# Patient Record
Sex: FEMALE | NOT HISPANIC OR LATINO | Employment: FULL TIME | URBAN - NONMETROPOLITAN AREA
[De-identification: names, ages, dates, MRNs, and addresses within clinical notes are randomized per-mention and may not be internally consistent; named-entity substitution may affect disease eponyms.]

---
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
## 2022-02-21 ENCOUNTER — HOSPITAL ENCOUNTER (EMERGENCY)
Facility: HOSPITAL | Age: 22
Discharge: HOME | End: 2022-02-21
Attending: EMERGENCY MEDICINE | Admitting: EMERGENCY MEDICINE
Payer: COMMERCIAL

## 2022-02-21 VITALS
TEMPERATURE: 96 F | HEIGHT: 64 IN | HEART RATE: 89 BPM | SYSTOLIC BLOOD PRESSURE: 167 MMHG | OXYGEN SATURATION: 97 % | DIASTOLIC BLOOD PRESSURE: 107 MMHG | RESPIRATION RATE: 16 BRPM | BODY MASS INDEX: 44.39 KG/M2 | WEIGHT: 260 LBS

## 2022-02-21 DIAGNOSIS — N39.0 UTI (URINARY TRACT INFECTION): ICD-10-CM

## 2022-02-21 DIAGNOSIS — K59.00 CONSTIPATION: Primary | ICD-10-CM

## 2022-02-21 LAB
BACTERIA UR QL AUTO: ABNORMAL /HPF
BILIRUB UR QL STRIP: NEGATIVE
CLARITY UR: ABNORMAL
COLOR UR: YELLOW
EXT PREG TEST URINE: NEGATIVE
EXT. CONTROL ED NAV: NORMAL
GLUCOSE UR STRIP-MCNC: NEGATIVE MG/DL
HGB UR QL STRIP.AUTO: NEGATIVE
KETONES UR STRIP-MCNC: NEGATIVE MG/DL
LEUKOCYTE ESTERASE UR QL STRIP: ABNORMAL
MUCOUS THREADS UR QL AUTO: ABNORMAL
NITRITE UR QL STRIP: NEGATIVE
NON-SQ EPI CELLS URNS QL MICRO: ABNORMAL /HPF
OTHER STN SPEC: ABNORMAL
PH UR STRIP.AUTO: 7 [PH]
PROT UR STRIP-MCNC: ABNORMAL MG/DL
RBC #/AREA URNS AUTO: ABNORMAL /HPF
SP GR UR STRIP.AUTO: 1.02 (ref 1–1.03)
UROBILINOGEN UR QL STRIP.AUTO: 0.2 E.U./DL
WBC #/AREA URNS AUTO: ABNORMAL /HPF

## 2022-02-21 PROCEDURE — 81025 URINE PREGNANCY TEST: CPT | Performed by: PHYSICIAN ASSISTANT

## 2022-02-21 PROCEDURE — 99283 EMERGENCY DEPT VISIT LOW MDM: CPT

## 2022-02-21 PROCEDURE — 81001 URINALYSIS AUTO W/SCOPE: CPT | Performed by: PHYSICIAN ASSISTANT

## 2022-02-21 PROCEDURE — 99284 EMERGENCY DEPT VISIT MOD MDM: CPT | Performed by: PHYSICIAN ASSISTANT

## 2022-02-21 RX ORDER — MAGNESIUM CARB/ALUMINUM HYDROX 105-160MG
296 TABLET,CHEWABLE ORAL ONCE
Status: COMPLETED | OUTPATIENT
Start: 2022-02-21 | End: 2022-02-21

## 2022-02-21 RX ORDER — CEPHALEXIN 500 MG/1
500 CAPSULE ORAL EVERY 6 HOURS SCHEDULED
Qty: 28 CAPSULE | Refills: 0 | Status: SHIPPED | OUTPATIENT
Start: 2022-02-21 | End: 2022-02-28

## 2022-02-21 RX ORDER — MAGNESIUM CARB/ALUMINUM HYDROX 105-160MG
296 TABLET,CHEWABLE ORAL ONCE
Qty: 296 ML | Refills: 0 | Status: SHIPPED | OUTPATIENT
Start: 2022-02-21 | End: 2022-03-20

## 2022-02-21 RX ADMIN — MAGNESIUM CITRATE 296 ML: 1.75 LIQUID ORAL at 15:40

## 2022-02-21 NOTE — ED PROVIDER NOTES
History  Chief Complaint   Patient presents with    Constipation     pt  reporting constipation, chest/breast pain; reporting vomiting that had been occurring over the past few days; reports not vomiting today     Patient presents to the emergency department today for evaluation of a few separate complaints  Her 1  Complaint is constipation  She states it is been 48 hours since her last bowel movement  She has some mild generalized abdominal pain  No history of abdominal surgical history  No vomiting today  No history of bowel obstruction  She also has complaints of bilateral breast pain  She states she does have her nipples pierced and she feels as though they are more sensitive today  She has concern for pregnancy although her last menstrual period was only 14 days ago  Prior to Admission Medications   Prescriptions Last Dose Informant Patient Reported? Taking? albuterol (PROVENTIL HFA,VENTOLIN HFA) 90 mcg/act inhaler   No No   Sig: Inhale 2 puffs every 4 (four) hours as needed for wheezing   sertraline (ZOLOFT) 100 mg tablet   No No   Sig: Take 1 tablet (100 mg total) by mouth daily   traZODone (DESYREL) 50 mg tablet   No No   Sig: Take 1 tablet (50 mg total) by mouth daily at bedtime as needed for sleep for up to 14 days      Facility-Administered Medications: None       Past Medical History:   Diagnosis Date    ADHD (attention deficit hyperactivity disorder)     Asthma        History reviewed  No pertinent surgical history  History reviewed  No pertinent family history  I have reviewed and agree with the history as documented  E-Cigarette/Vaping    E-Cigarette Use Never User      E-Cigarette/Vaping Substances     Social History     Tobacco Use    Smoking status: Never Smoker    Smokeless tobacco: Never Used    Tobacco comment: pt admits to using marijuana   Vaping Use    Vaping Use: Never used   Substance Use Topics    Alcohol use: Yes     Comment: socially    Drug use:  Yes Types: Marijuana     Comment: daily  Review of Systems   Constitutional: Negative for chills and fever  HENT: Negative for ear pain and sore throat  Eyes: Negative for pain and visual disturbance  Respiratory: Negative for cough and shortness of breath  Cardiovascular: Negative for chest pain and palpitations  Gastrointestinal: Positive for constipation  Negative for abdominal pain and vomiting  Genitourinary: Negative for dysuria and hematuria  Breast pain   Musculoskeletal: Negative for arthralgias and back pain  Skin: Negative for color change and rash  Neurological: Negative for seizures and syncope  All other systems reviewed and are negative  Physical Exam  Physical Exam  Vitals reviewed  Constitutional:       General: She is not in acute distress  Appearance: Normal appearance  She is obese  She is not ill-appearing, toxic-appearing or diaphoretic  HENT:      Head: Normocephalic and atraumatic  Right Ear: External ear normal       Left Ear: External ear normal    Eyes:      General: No scleral icterus  Right eye: No discharge  Left eye: No discharge  Extraocular Movements: Extraocular movements intact  Conjunctiva/sclera: Conjunctivae normal    Cardiovascular:      Rate and Rhythm: Normal rate  Pulses: Normal pulses  Pulmonary:      Effort: Pulmonary effort is normal  No respiratory distress  Breath sounds: No stridor  Abdominal:      General: Abdomen is flat  There is no distension  Palpations: Abdomen is soft  There is no mass  Tenderness: There is no guarding or rebound  Hernia: No hernia is present  Musculoskeletal:         General: No deformity or signs of injury  Cervical back: Normal range of motion  No rigidity  Skin:     General: Skin is warm  Coloration: Skin is not jaundiced  Findings: No lesion or rash  Neurological:      General: No focal deficit present        Mental Status: She is alert and oriented to person, place, and time  Mental status is at baseline  Gait: Gait normal    Psychiatric:         Mood and Affect: Mood normal          Thought Content:  Thought content normal          Judgment: Judgment normal          Vital Signs  ED Triage Vitals [02/21/22 1436]   Temperature Pulse Respirations Blood Pressure SpO2   (!) 96 °F (35 6 °C) 89 16 (!) 167/107 97 %      Temp Source Heart Rate Source Patient Position - Orthostatic VS BP Location FiO2 (%)   Tympanic Monitor -- -- --      Pain Score       6           Vitals:    02/21/22 1436   BP: (!) 167/107   Pulse: 89         Visual Acuity      ED Medications  Medications   magnesium citrate (CITROMA) oral solution 296 mL (has no administration in time range)       Diagnostic Studies  Results Reviewed     Procedure Component Value Units Date/Time    Urine Microscopic [[id number]]  (Abnormal) Collected: 02/21/22 1453    Lab Status: Final result Specimen: Urine, Clean Catch Updated: 02/21/22 1513     RBC, UA None Seen /hpf      WBC, UA 10-20 /hpf      Epithelial Cells Occasional /hpf      Bacteria, UA Moderate /hpf      OTHER OBSERVATIONS Trichomonas Organisms Present     MUCUS THREADS Moderate    UA (URINE) with reflex to Scope [[id number]]  (Abnormal) Collected: 02/21/22 1453    Lab Status: Final result Specimen: Urine, Clean Catch Updated: 02/21/22 1459     Color, UA Yellow     Clarity, UA Slightly Cloudy     Specific [place], UA 1 020     pH, UA 7 0     Leukocytes, UA Small     Nitrite, UA Negative     Protein, UA Trace mg/dl      Glucose, UA Negative mg/dl      Ketones, UA Negative mg/dl      Urobilinogen, UA 0 2 E U /dl      Bilirubin, UA Negative     Blood, UA Negative    POCT pregnancy, urine [[id number]]  (Normal) Resulted: 02/21/22 1458    Lab Status: Final result Updated: 02/21/22 1459     EXT PREG TEST UR (Ref: Negative) negative     Control valid                 No orders to display              Procedures  Procedures ED Course  ED Course as of 02/21/22 1519   Mon Feb 21, 2022   1505 PREGNANCY TEST URINE: negative   1505 Leukocytes, UA(!): Small   1505 Awaiting urine micro   1515 Bacteria, UA(!): Moderate   1515 WBC, UA(!): 10-20                                             MDM    Disposition  Final diagnoses:   Constipation   UTI (urinary tract infection)     Time reflects when diagnosis was documented in both MDM as applicable and the Disposition within this note     Time User Action Codes Description Comment    2/21/2022  3:15 PM Rhonda Pierce Add [K59 00] Constipation     2/21/2022  3:15 PM Lisa Mccallum CHATA Add [N39 0] UTI (urinary tract infection)       ED Disposition     ED Disposition Condition Date/Time Comment    Discharge Stable Mon Feb 21, 2022  3:15 PM Pr-14 Jerri Huertas 917 discharge to home/self care  Follow-up Information     Follow up With Specialties Details Why Contact Info    Makenzie Ornelas PA-C Physician Assistant, Pediatrics Schedule an appointment as soon as possible for a visit  As needed [address] [place] [id number] [phone]            Patient's Medications   Discharge Prescriptions    CEPHALEXIN (KEFLEX) 500 MG CAPSULE    Take 1 capsule (500 mg total) by mouth every 6 (six) hours for 7 days       Start Date: 2/21/2022 End Date: 2/28/2022       Order Dose: 500 mg       Quantity: 28 capsule    Refills: 0    MAGNESIUM CITRATE (CITROMA) 1 745 G/30 ML ORAL SOLUTION    Take 296 mL by mouth once for 1 dose       Start Date: 2/21/2022 End Date: 2/21/2022       Order Dose: 296 mL       Quantity: 296 mL    Refills: 0       No discharge procedures on file      PDMP Review       Value Time User    PDMP Reviewed  Yes 9/24/2021  8:42 AM Kia Hackett MD          ED Provider  Electronically Signed by           Prince Fernandez PA-C  02/21/22 0178

## 2022-03-20 ENCOUNTER — HOSPITAL ENCOUNTER (EMERGENCY)
Facility: HOSPITAL | Age: 22
Discharge: HOME | End: 2022-03-20
Attending: EMERGENCY MEDICINE | Admitting: EMERGENCY MEDICINE
Payer: COMMERCIAL

## 2022-03-20 ENCOUNTER — APPOINTMENT (EMERGENCY)
Dept: RADIOLOGY | Facility: HOSPITAL | Age: 22
End: 2022-03-20
Payer: COMMERCIAL

## 2022-03-20 VITALS
SYSTOLIC BLOOD PRESSURE: 137 MMHG | TEMPERATURE: 96.9 F | DIASTOLIC BLOOD PRESSURE: 97 MMHG | RESPIRATION RATE: 18 BRPM | WEIGHT: 261 LBS | BODY MASS INDEX: 44.8 KG/M2 | OXYGEN SATURATION: 99 % | HEART RATE: 86 BPM

## 2022-03-20 DIAGNOSIS — M79.89 SWELLING OF RIGHT RING FINGER: ICD-10-CM

## 2022-03-20 DIAGNOSIS — M79.672 LEFT FOOT PAIN: ICD-10-CM

## 2022-03-20 DIAGNOSIS — M54.9 BACK PAIN: Primary | ICD-10-CM

## 2022-03-20 PROCEDURE — 73630 X-RAY EXAM OF FOOT: CPT

## 2022-03-20 PROCEDURE — 99284 EMERGENCY DEPT VISIT MOD MDM: CPT | Performed by: EMERGENCY MEDICINE

## 2022-03-20 PROCEDURE — 96372 THER/PROPH/DIAG INJ SC/IM: CPT

## 2022-03-20 PROCEDURE — 73140 X-RAY EXAM OF FINGER(S): CPT

## 2022-03-20 PROCEDURE — 99284 EMERGENCY DEPT VISIT MOD MDM: CPT

## 2022-03-20 RX ORDER — METHOCARBAMOL 500 MG/1
500 TABLET, FILM COATED ORAL 2 TIMES DAILY PRN
Qty: 20 TABLET | Refills: 0 | Status: SHIPPED | OUTPATIENT
Start: 2022-03-20

## 2022-03-20 RX ORDER — NAPROXEN 500 MG/1
500 TABLET ORAL 2 TIMES DAILY WITH MEALS
Qty: 10 TABLET | Refills: 0 | Status: SHIPPED | OUTPATIENT
Start: 2022-03-20 | End: 2022-03-25

## 2022-03-20 RX ORDER — KETOROLAC TROMETHAMINE 30 MG/ML
30 INJECTION, SOLUTION INTRAMUSCULAR; INTRAVENOUS ONCE
Status: COMPLETED | OUTPATIENT
Start: 2022-03-20 | End: 2022-03-20

## 2022-03-20 RX ORDER — METHOCARBAMOL 500 MG/1
500 TABLET, FILM COATED ORAL ONCE
Status: COMPLETED | OUTPATIENT
Start: 2022-03-20 | End: 2022-03-20

## 2022-03-20 RX ORDER — LIDOCAINE 50 MG/G
1 PATCH TOPICAL ONCE
Status: DISCONTINUED | OUTPATIENT
Start: 2022-03-20 | End: 2022-03-20 | Stop reason: HOSPADM

## 2022-03-20 RX ADMIN — LIDOCAINE 5% 1 PATCH: 700 PATCH TOPICAL at 18:38

## 2022-03-20 RX ADMIN — KETOROLAC TROMETHAMINE 30 MG: 30 INJECTION, SOLUTION INTRAMUSCULAR at 18:38

## 2022-03-20 RX ADMIN — METHOCARBAMOL 500 MG: 500 TABLET ORAL at 18:38
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
## 2022-08-12 ENCOUNTER — HOSPITAL ENCOUNTER (EMERGENCY)
Facility: HOSPITAL | Age: 22
Discharge: STILL ADMITTED OR ADMINISTRATIVE | End: 2022-08-13
Attending: EMERGENCY MEDICINE | Admitting: EMERGENCY MEDICINE
Payer: COMMERCIAL

## 2022-08-12 VITALS
DIASTOLIC BLOOD PRESSURE: 74 MMHG | RESPIRATION RATE: 17 BRPM | HEART RATE: 100 BPM | TEMPERATURE: 97.3 F | BODY MASS INDEX: 44.65 KG/M2 | OXYGEN SATURATION: 99 % | SYSTOLIC BLOOD PRESSURE: 146 MMHG | WEIGHT: 260.14 LBS

## 2022-08-12 DIAGNOSIS — N93.9 VAGINAL BLEEDING: Primary | ICD-10-CM

## 2022-08-12 DIAGNOSIS — B37.31 VAGINAL YEAST INFECTION: ICD-10-CM

## 2022-08-12 DIAGNOSIS — R10.9 ABDOMINAL CRAMPING: ICD-10-CM

## 2022-08-12 DIAGNOSIS — N39.0 UTI (URINARY TRACT INFECTION): ICD-10-CM

## 2022-08-12 PROCEDURE — 99284 EMERGENCY DEPT VISIT MOD MDM: CPT

## 2022-08-13 ENCOUNTER — HOSPITAL ENCOUNTER (OUTPATIENT)
Facility: HOSPITAL | Age: 22
Discharge: HOME | End: 2022-08-13
Attending: OBSTETRICS & GYNECOLOGY | Admitting: OBSTETRICS & GYNECOLOGY
Payer: COMMERCIAL

## 2022-08-13 VITALS
RESPIRATION RATE: 16 BRPM | DIASTOLIC BLOOD PRESSURE: 56 MMHG | TEMPERATURE: 98.1 F | HEART RATE: 84 BPM | SYSTOLIC BLOOD PRESSURE: 106 MMHG

## 2022-08-13 PROBLEM — Z3A.24 24 WEEKS GESTATION OF PREGNANCY: Status: ACTIVE | Noted: 2022-08-13

## 2022-08-13 PROBLEM — B37.31 VAGINAL YEAST INFECTION: Status: ACTIVE | Noted: 2022-08-13

## 2022-08-13 PROBLEM — N39.0 URINARY TRACT INFECTION: Status: ACTIVE | Noted: 2022-08-13

## 2022-08-13 PROBLEM — B37.3 VAGINAL YEAST INFECTION: Status: ACTIVE | Noted: 2022-08-13

## 2022-08-13 LAB
ABO GROUP BLD: NORMAL
ALBUMIN SERPL BCP-MCNC: 2.9 G/DL (ref 3.5–5)
ALP SERPL-CCNC: 80 U/L (ref 46–384)
ALT SERPL W P-5'-P-CCNC: 33 U/L (ref 12–78)
ANION GAP SERPL CALCULATED.3IONS-SCNC: 10 MMOL/L (ref 4–13)
AST SERPL W P-5'-P-CCNC: 19 U/L (ref 5–45)
BACTERIA UR QL AUTO: ABNORMAL /HPF
BASOPHILS # BLD AUTO: 0.02 THOUSANDS/ΜL (ref 0–0.1)
BASOPHILS NFR BLD AUTO: 0 % (ref 0–1)
BILIRUB SERPL-MCNC: 0.27 MG/DL (ref 0.2–1)
BILIRUB UR QL STRIP: ABNORMAL
BLD GP AB SCN SERPL QL: NEGATIVE
BUN SERPL-MCNC: 9 MG/DL (ref 5–25)
CALCIUM ALBUM COR SERPL-MCNC: 9.5 MG/DL (ref 8.3–10.1)
CALCIUM SERPL-MCNC: 8.6 MG/DL (ref 8.3–10.1)
CHLORIDE SERPL-SCNC: 104 MMOL/L (ref 96–108)
CLARITY UR: ABNORMAL
CO2 SERPL-SCNC: 26 MMOL/L (ref 21–32)
COLOR UR: YELLOW
CREAT SERPL-MCNC: 0.61 MG/DL (ref 0.6–1.3)
EOSINOPHIL # BLD AUTO: 0.06 THOUSAND/ΜL (ref 0–0.61)
EOSINOPHIL NFR BLD AUTO: 1 % (ref 0–6)
ERYTHROCYTE [DISTWIDTH] IN BLOOD BY AUTOMATED COUNT: 11.9 % (ref 11.6–15.1)
GFR SERPL CREATININE-BSD FRML MDRD: 131 ML/MIN/1.73SQ M
GLUCOSE SERPL-MCNC: 84 MG/DL (ref 65–140)
GLUCOSE UR STRIP-MCNC: NEGATIVE MG/DL
HCT VFR BLD AUTO: 33.6 % (ref 34.8–46.1)
HGB BLD-MCNC: 10.6 G/DL (ref 11.5–15.4)
HGB UR QL STRIP.AUTO: ABNORMAL
IMM GRANULOCYTES # BLD AUTO: 0.04 THOUSAND/UL (ref 0–0.2)
IMM GRANULOCYTES NFR BLD AUTO: 1 % (ref 0–2)
KETONES UR STRIP-MCNC: ABNORMAL MG/DL
LEUKOCYTE ESTERASE UR QL STRIP: ABNORMAL
LYMPHOCYTES # BLD AUTO: 2.95 THOUSANDS/ΜL (ref 0.6–4.47)
LYMPHOCYTES NFR BLD AUTO: 43 % (ref 14–44)
MCH RBC QN AUTO: 30 PG (ref 26.8–34.3)
MCHC RBC AUTO-ENTMCNC: 31.5 G/DL (ref 31.4–37.4)
MCV RBC AUTO: 95 FL (ref 82–98)
MONOCYTES # BLD AUTO: 0.46 THOUSAND/ΜL (ref 0.17–1.22)
MONOCYTES NFR BLD AUTO: 7 % (ref 4–12)
MUCOUS THREADS UR QL AUTO: ABNORMAL
NEUTROPHILS # BLD AUTO: 3.32 THOUSANDS/ΜL (ref 1.85–7.62)
NEUTS SEG NFR BLD AUTO: 48 % (ref 43–75)
NITRITE UR QL STRIP: NEGATIVE
NON-SQ EPI CELLS URNS QL MICRO: ABNORMAL /HPF
NRBC BLD AUTO-RTO: 0 /100 WBCS
OTHER STN SPEC: ABNORMAL
PH UR STRIP.AUTO: 7 [PH]
PLATELET # BLD AUTO: 240 THOUSANDS/UL (ref 149–390)
PMV BLD AUTO: 8.8 FL (ref 8.9–12.7)
POTASSIUM SERPL-SCNC: 3.6 MMOL/L (ref 3.5–5.3)
PROT SERPL-MCNC: 6.7 G/DL (ref 6.4–8.4)
PROT UR STRIP-MCNC: ABNORMAL MG/DL
RBC # BLD AUTO: 3.53 MILLION/UL (ref 3.81–5.12)
RBC #/AREA URNS AUTO: ABNORMAL /HPF
RH BLD: POSITIVE
SODIUM SERPL-SCNC: 140 MMOL/L (ref 135–147)
SP GR UR STRIP.AUTO: 1.02 (ref 1–1.03)
SPECIMEN EXPIRATION DATE: NORMAL
UROBILINOGEN UR QL STRIP.AUTO: 4 E.U./DL
WBC # BLD AUTO: 6.85 THOUSAND/UL (ref 4.31–10.16)
WBC #/AREA URNS AUTO: ABNORMAL /HPF

## 2022-08-13 PROCEDURE — 80053 COMPREHEN METABOLIC PANEL: CPT | Performed by: EMERGENCY MEDICINE

## 2022-08-13 PROCEDURE — 76815 OB US LIMITED FETUS(S): CPT | Performed by: EMERGENCY MEDICINE

## 2022-08-13 PROCEDURE — 99215 OFFICE O/P EST HI 40 MIN: CPT

## 2022-08-13 PROCEDURE — 85025 COMPLETE CBC W/AUTO DIFF WBC: CPT | Performed by: EMERGENCY MEDICINE

## 2022-08-13 PROCEDURE — 86850 RBC ANTIBODY SCREEN: CPT | Performed by: EMERGENCY MEDICINE

## 2022-08-13 PROCEDURE — 99284 EMERGENCY DEPT VISIT MOD MDM: CPT | Performed by: EMERGENCY MEDICINE

## 2022-08-13 PROCEDURE — 87086 URINE CULTURE/COLONY COUNT: CPT | Performed by: EMERGENCY MEDICINE

## 2022-08-13 PROCEDURE — 36415 COLL VENOUS BLD VENIPUNCTURE: CPT | Performed by: EMERGENCY MEDICINE

## 2022-08-13 PROCEDURE — 86900 BLOOD TYPING SEROLOGIC ABO: CPT | Performed by: EMERGENCY MEDICINE

## 2022-08-13 PROCEDURE — 81001 URINALYSIS AUTO W/SCOPE: CPT | Performed by: EMERGENCY MEDICINE

## 2022-08-13 PROCEDURE — NC001 PR NO CHARGE: Performed by: OBSTETRICS & GYNECOLOGY

## 2022-08-13 PROCEDURE — 86901 BLOOD TYPING SEROLOGIC RH(D): CPT | Performed by: EMERGENCY MEDICINE

## 2022-08-13 RX ORDER — FLUCONAZOLE 100 MG/1
200 TABLET ORAL ONCE
Status: COMPLETED | OUTPATIENT
Start: 2022-08-13 | End: 2022-08-13

## 2022-08-13 RX ORDER — FLUCONAZOLE 100 MG/1
200 TABLET ORAL DAILY
Status: DISCONTINUED | OUTPATIENT
Start: 2022-08-13 | End: 2022-08-13

## 2022-08-13 RX ORDER — SODIUM CHLORIDE FOR INHALATION 0.9 %
VIAL, NEBULIZER (ML) INHALATION
Status: COMPLETED
Start: 2022-08-13 | End: 2022-08-13

## 2022-08-13 RX ORDER — CLOTRIMAZOLE 1 %
1 CREAM WITH APPLICATOR VAGINAL
Status: DISCONTINUED | OUTPATIENT
Start: 2022-08-13 | End: 2022-08-13 | Stop reason: HOSPADM

## 2022-08-13 RX ORDER — CLOTRIMAZOLE 1 %
1 CREAM WITH APPLICATOR VAGINAL
Qty: 45 G | Refills: 0 | Status: SHIPPED | OUTPATIENT
Start: 2022-08-13 | End: 2022-08-13

## 2022-08-13 RX ORDER — CEPHALEXIN 500 MG/1
500 CAPSULE ORAL EVERY 12 HOURS SCHEDULED
Qty: 10 CAPSULE | Refills: 0 | Status: SHIPPED | OUTPATIENT
Start: 2022-08-13 | End: 2022-08-18

## 2022-08-13 RX ORDER — CEPHALEXIN 250 MG/1
500 CAPSULE ORAL ONCE
Status: COMPLETED | OUTPATIENT
Start: 2022-08-13 | End: 2022-08-13

## 2022-08-13 RX ADMIN — CEPHALEXIN 500 MG: 250 CAPSULE ORAL at 02:26

## 2022-08-13 RX ADMIN — FLUCONAZOLE 200 MG: 100 TABLET ORAL at 13:25

## 2022-08-13 NOTE — ED NOTES
Evangelina and mom having multiple questions regarding transfer and destination   Dr Jose Gonzales at bedside     [facility]  08/13/22 2064

## 2022-08-13 NOTE — PROGRESS NOTES
L&D Triage Note - OB/GYN  Mary Lou Mcknight 25 y o  female MRN: [id number]  Unit/Bed#:  TRIAGE  Encounter: [id number]      ASSESSMENT:    Ruchi Pulido is a 25 y o   at 24w5d here for ROL due to spotting and abdominal cramping  PLAN:    1) Speculum exam: No blood visualized, white cottage cheese like discharge in the vaginal vault  2) UTI: continue Keflex rxn     3) Vaginal yeast infection: Fluconazole, one dose    3) Continue routine prenatal care    4) Discharge from [facility] triage with  labor precautions    - Reviewed rupture of membranes, false vs true labor, decreased fetal movement, and vaginal bleeding   - Pt to call provider with any concerns and follow up at her next scheduled prenatal appointment    - Case discussed with Dr Broderick Lomeli:    Ruchi Pulido 25 y o   at 24w5d with an Estimated Date of Delivery: 22 who was transferred to [address] [place] [address] from [facility] due to cramping and spotting  Patient reports that she noticed the spotting yesterday when she wipes after urination  The cramping also started around the same time yesterday  Patient denies urgency, frequency, itching or burning sensation during urination  Patient denies fever, blurry vision and headaches  Patient reports good fetal movement, denies vaginal bleeding and LOF      Her current obstetrical history is significant for 19w9d gestational age pregnancy, UTI and vaginal yeast infection    Her past obstetrical history: None    Contractions: None  Leakage of fluid: None  Vaginal Bleeding: None  Fetal movement: present    OBJECTIVE:    Vitals:    22 0935   BP: 106/56   Pulse: 84   Resp: 16   Temp:        ROS:  Constitutional: Negative  Respiratory: Negative  Cardiovascular: Negative  Gastrointestinal: Negative    General Physical Exam:  General: in no apparent distress  Cardiovascular: Cor RRR  Lungs: non-labored breathing  Abdomen: abdomen is soft without significant tenderness, masses, organomegaly or guarding  Lower extremeties: nontender    Cervical Exam  Speculum: Cervical os is closed  SVE: 0 / 0% / -5    Fetal monitoring:  FHT:  135 bpm/ Moderate 6 - 25 bpm / 10 x 10  accelerations present, No decelerations    Oakes: contractions absent    KOH/WTMT:     Infection:              -  Hyphae prese   -  No clue cells    - No trichomonads present    Membrane status   - negative  ferning   - negative nitrazene   - negative pooling         Imaging:       TVUS   - Cervical length    - 3 14cm    - 3 04cm    - 3 58cm          Discussed with Dr Jennifer Silvestre MD  OBGYN PGY-1  8/13/2022 12:35 PM

## 2022-08-13 NOTE — ED PROVIDER NOTES
History  Chief Complaint   Patient presents with    Vaginal Bleeding - Pregnant     6 months pregnant-started spoitting a short while  ago  Had mild cramping  earlier  Feels baby kicking     81-year-old female, , approximately 6 months pregnant, who presents for vaginal spotting  Patient states that she 1st noticed it when she went to the bathroom, after wiping she noticed a small amount of blood  She states that she then checked later and also notices blood  She denies any dysuria or frequency  She states that after she had the episode of bleeding, she was very anxious, and noticed some mild abdominal cramping and had an episode of vomiting  She currently has no abdominal pain  She has no back pain  ROS otherwise negative  Prior to Admission Medications   Prescriptions Last Dose Informant Patient Reported? Taking? methocarbamol (ROBAXIN) 500 mg tablet   No No   Sig: Take 1 tablet (500 mg total) by mouth 2 (two) times a day as needed for muscle spasms      Facility-Administered Medications: None       Past Medical History:   Diagnosis Date    ADHD (attention deficit hyperactivity disorder)     Asthma        History reviewed  No pertinent surgical history  History reviewed  No pertinent family history  I have reviewed and agree with the history as documented  E-Cigarette/Vaping    E-Cigarette Use Current Every Day User      E-Cigarette/Vaping Substances     Social History     Tobacco Use    Smoking status: Never Smoker    Smokeless tobacco: Never Used    Tobacco comment: pt admits to using marijuana   Vaping Use    Vaping Use: Every day   Substance Use Topics    Alcohol use: Yes     Comment: socially    Drug use: Not Currently     Types: Marijuana     Comment: daily  Review of Systems   Constitutional: Negative for chills and fever  HENT: Negative for congestion, rhinorrhea and sore throat  Respiratory: Negative for cough and shortness of breath      Cardiovascular: Negative for chest pain and palpitations  Gastrointestinal: Negative for abdominal pain, constipation, diarrhea, nausea and vomiting  Genitourinary: Positive for vaginal bleeding  Negative for difficulty urinating and flank pain  Musculoskeletal: Negative for arthralgias  Neurological: Negative for dizziness, weakness, light-headedness and headaches  Psychiatric/Behavioral: Negative for agitation, behavioral problems and confusion  All other systems reviewed and are negative  Physical Exam  Physical Exam  Constitutional:       Appearance: She is well-developed  Comments: Patient is well-appearing and in no distress  HENT:      Head: Normocephalic and atraumatic  Cardiovascular:      Rate and Rhythm: Normal rate and regular rhythm  Heart sounds: Normal heart sounds  No murmur heard  No friction rub  Pulmonary:      Effort: Pulmonary effort is normal  No respiratory distress  Breath sounds: Normal breath sounds  No wheezing or rales  Abdominal:      General: Bowel sounds are normal  There is no distension  Palpations: Abdomen is soft  Tenderness: There is no abdominal tenderness  Comments: Abdomen is soft, nontender  Musculoskeletal:         General: Normal range of motion  Cervical back: Normal range of motion and neck supple  Skin:     General: Skin is warm  Neurological:      Mental Status: She is alert and oriented to person, place, and time  Coordination: Coordination normal    Psychiatric:         Behavior: Behavior normal          Thought Content:  Thought content normal          Judgment: Judgment normal          Vital Signs  ED Triage Vitals [08/12/22 2347]   Temperature Pulse Respirations Blood Pressure SpO2   (!) 97 3 °F (36 3 °C) 100 17 146/74 99 %      Temp Source Heart Rate Source Patient Position - Orthostatic VS BP Location FiO2 (%)   Tympanic Monitor Sitting Left arm --      Pain Score       --           Vitals:    08/12/22 2347 BP: 146/74   Pulse: 100   Patient Position - Orthostatic VS: Sitting         Visual Acuity      ED Medications  Medications   cephalexin (KEFLEX) capsule 500 mg (500 mg Oral Given 8/13/22 0226)       Diagnostic Studies  Results Reviewed     Procedure Component Value Units Date/Time    Urine Microscopic [[id number]]  (Abnormal) Collected: 08/13/22 0059    Lab Status: Final result Specimen: Urine, Clean Catch Updated: 08/13/22 0138     RBC, UA 1-2 /hpf      WBC, UA 10-20 /hpf      Epithelial Cells Moderate /hpf      Bacteria, UA Moderate /hpf      OTHER OBSERVATIONS Yeast Cells Present     MUCUS THREADS Moderate     URINE COMMENT --    Comprehensive metabolic panel [[id number]]  (Abnormal) Collected: 08/13/22 0059    Lab Status: Final result Specimen: Blood from Arm, Right Updated: 08/13/22 0119     Sodium 140 mmol/L      Potassium 3 6 mmol/L      Chloride 104 mmol/L      CO2 26 mmol/L      ANION GAP 10 mmol/L      BUN 9 mg/dL      Creatinine 0 61 mg/dL      Glucose 84 mg/dL      Calcium 8 6 mg/dL      Corrected Calcium 9 5 mg/dL      AST 19 U/L      ALT 33 U/L      Alkaline Phosphatase 80 U/L      Total Protein 6 7 g/dL      Albumin 2 9 g/dL      Total Bilirubin 0 27 mg/dL      eGFR 131 ml/min/1 73sq m     Narrative:      [place] guidelines for Chronic Kidney Disease (CKD):     Stage 1 with normal or high GFR (GFR > 90 mL/min/1 73 square meters)    Stage 2 Mild CKD (GFR = 60-89 mL/min/1 73 square meters)    Stage 3A Moderate CKD (GFR = 45-59 mL/min/1 73 square meters)    Stage 3B Moderate CKD (GFR = 30-44 mL/min/1 73 square meters)    Stage 4 Severe CKD (GFR = 15-29 mL/min/1 73 square meters)    Stage 5 End Stage CKD (GFR <15 mL/min/1 73 square meters)  Note: GFR calculation is accurate only with a steady state creatinine    UA w Reflex to Microscopic w Reflex to Culture [[id number]]  (Abnormal) Collected: 08/13/22 0059    Lab Status: Final result Specimen: Urine, Clean Catch Updated: 08/13/22 0117     Color, UA Yellow     Clarity, UA Slightly Cloudy     Specific [place], UA 1 020     pH, UA 7 0     Leukocytes, UA Moderate     Nitrite, UA Negative     Protein, UA Trace mg/dl      Glucose, UA Negative mg/dl      Ketones, UA Trace mg/dl      Urobilinogen, UA 4 0 E U /dl      Bilirubin, UA Interference- unable to analyze     Occult Blood, UA Trace-Intact     URINE COMMENT --    Urine culture [[id number]] Collected: 08/13/22 0059    Lab Status:  In process Specimen: Urine, Clean Catch Updated: 08/13/22 0117    CBC and differential [[id number]]  (Abnormal) Collected: 08/13/22 0059    Lab Status: Final result Specimen: Blood from Arm, Right Updated: 08/13/22 0106     WBC 6 85 Thousand/uL      RBC 3 53 Million/uL      Hemoglobin 10 6 g/dL      Hematocrit 33 6 %      MCV 95 fL      MCH 30 0 pg      MCHC 31 5 g/dL      RDW 11 9 %      MPV 8 8 fL      Platelets 097 Thousands/uL      nRBC 0 /100 WBCs      Neutrophils Relative 48 %      Immat GRANS % 1 %      Lymphocytes Relative 43 %      Monocytes Relative 7 %      Eosinophils Relative 1 %      Basophils Relative 0 %      Neutrophils Absolute 3 32 Thousands/µL      Immature Grans Absolute 0 04 Thousand/uL      Lymphocytes Absolute 2 95 Thousands/µL      Monocytes Absolute 0 46 Thousand/µL      Eosinophils Absolute 0 06 Thousand/µL      Basophils Absolute 0 02 Thousands/µL                  No orders to display              Procedures  POC Pelvic US    Date/Time: 8/13/2022 3:43 AM  Performed by: Lucy Valente MD  Authorized by: Lucy Valente MD     Patient location:  ED  Procedure details:     Exam Type:  Diagnostic    Indications: pregnant with vaginal bleeding      Assessment for: evaluate fetal viability      Technique:  Transabdominal obstetric (HCG+) exam    Image quality: diagnostic      Image availability:  Images available in PACS  Uterine findings:     Intrauterine pregnancy: identified      Single gestation: identified Fetal heart rate: identified      Fetal heart rate (bpm):  141  Other findings:     Free pelvic fluid: not identified      Free peritoneal fluid: not identified    Interpretation:     Ultrasound impressions: normal      Pregnancy findings: intrauterine pregnancy (IUP)               ED Course  ED Course as of 08/13/22 2101   Sat Aug 13, 2022   0141 Bacteria, UA(!): Moderate   0141 WBC, UA(!): 10-20   0325 González sy   0354 Transfer to [facility] for vaginal spotting - pickup time 6:30                                             MDM  Number of Diagnoses or Management Options  Abdominal cramping  UTI (urinary tract infection)  Vaginal bleeding  Vaginal yeast infection  Diagnosis management comments: Patient within the past several days had an ultrasound done which demonstrated anterior placenta, making placenta previa unlikely  Will obtain labs, urine  Will discuss with her obstetrician at [facility] in [address]  Patient is mildly hypertensive here, in the 813 systolic  However, her urine only demonstrates trace protein (not 2+ required for diagnosis of preeclampsia) and patient has no other symptoms suggestive of preeclampsia or eclampsia at this time  Hemoglobin is 10 6  Fetal heart rate of 141  Patient's urinalysis demonstrated evidence of UTI as well as yeast infection  Patient was started on clotrimazole intravaginal course for 7 days, 1st dose given here  Patient was also started on Keflex with scripts written  Patient was made aware that her script was sent to the pharmacy  [facility] could not accept the patient, however recommended if she had ongoing bleeding she should be transferred elsewhere  I discussed with OB at [facility] that accepted the patient         Disposition  Final diagnoses:   Vaginal bleeding   Abdominal cramping   UTI (urinary tract infection)   Vaginal yeast infection     Time reflects when diagnosis was documented in both MDM as applicable and the Disposition within this note     Time User Action Codes Description Comment    8/13/2022  2:43 AM Jasmyn Garcia Add [N93 9] Vaginal bleeding     8/13/2022  2:43 AM Alison Clemons Add [R10 9] Abdominal cramping     8/13/2022  3:36 AM Deonte, Hurley Monks Add [N39 0] UTI (urinary tract infection)     8/13/2022  3:36 AM Deonte, Hurley Monks Add [B37 3] Vaginal yeast infection       ED Disposition     ED Disposition   Transfer to Another Facility-In Network    Condition   --    Date/Time   Sat Aug 13, 2022  3:29 AM    Comment   Yuliya Mcknight should be transferred out to [facility]             MD Documentation    Pavel Jaimes Most Recent Value   Patient Condition The patient has been stabilized such that within reasonable medical probability, no material deterioration of the patient condition or the condition of the unborn child(zelalem) is likely to result from the transfer   Reason for Transfer Level of Care needed not available at this facility   Benefits of Transfer Specialized equipment and/or services available at the receiving facility (Include comment)________________________  [OB]   Risks of Transfer Potential for delay in receiving treatment, Potential deterioration of medical condition, Loss of IV, Increased discomfort during transfer, Possible worsening of condition or death during transfer   Accepting Physician [address] Name, Celia Barfield   Sending MD [facility]   Provider Certification Risk of worsening condition      RN Documentation    [address] Name, Celia Barfield      Follow-up Information    None         Discharge Medication List as of 8/13/2022  7:51 AM      START taking these medications    Details   cephalexin (KEFLEX) 500 mg capsule Take 1 capsule (500 mg total) by mouth every 12 (twelve) hours for 5 days, Starting Sat 8/13/2022, Until Thu 8/18/2022, Normal      clotrimazole (GYNE-LOTRIMIN) 1 % vaginal cream Insert 1 applicator into the vagina daily at bedtime for 6 days, Starting Sat 8/13/2022, Until Fri 8/19/2022, Normal         CONTINUE these medications which have NOT CHANGED    Details   methocarbamol (ROBAXIN) 500 mg tablet Take 1 tablet (500 mg total) by mouth 2 (two) times a day as needed for muscle spasms, Starting Sun 3/20/2022, Normal      naproxen (Naprosyn) 500 mg tablet Take 1 tablet (500 mg total) by mouth 2 (two) times a day with meals for 5 days, Starting Sun 3/20/2022, Until Fri 3/25/2022, Normal             No discharge procedures on file      PDMP Review       Value Time User    PDMP Reviewed  Yes 9/24/2021  8:42 AM Prerna Ward MD          ED Provider  Electronically Signed by           Radha Klein MD  08/13/22 8007

## 2022-08-13 NOTE — ED NOTES
Accepted to Joey [David] to L&D triage, service of Dr Mandy Andujar to  at 0630  Report to [phone]       Felecia Figueroa RN  08/13/22 3953

## 2022-08-13 NOTE — EMTALA/ACUTE CARE TRANSFER
[address] [facility] EMERGENCY DEPARTMENT  [address] [facility] [address]  Dept: [phone]      EMTALA TRANSFER CONSENT    NAME Leighann Mcknight                                         2000                              MRN [id number]    I have been informed of my rights regarding examination, treatment, and transfer   by Dr Noemy Hugo*    Benefits: Specialized equipment and/or services available at the receiving facility (Include comment)________________________ (OB)    Risks: Potential for delay in receiving treatment, Potential deterioration of medical condition, Loss of IV, Increased discomfort during transfer, Possible worsening of condition or death during transfer      Consent for Transfer:  I acknowledge that my medical condition has been evaluated and explained to me by the emergency department physician or other qualified medical person and/or my attending physician, who has recommended that I be transferred to the service of  Accepting Physician: [address] at [address] Name, [address] : [facility]  The above potential benefits of such transfer, the potential risks associated with such transfer, and the probable risks of not being transferred have been explained to me, and I fully understand them  The doctor has explained that, in my case, the benefits of transfer outweigh the risks  I agree to be transferred  I authorize the performance of emergency medical procedures and treatments upon me in both transit and upon arrival at the receiving facility  Additionally, I authorize the release of any and all medical records to the receiving facility and request they be transported with me, if possible  I understand that the safest mode of transportation during a medical emergency is an ambulance and that the Hospital advocates the use of this mode of transport   Risks of traveling to the receiving facility by car, including absence of medical control, life sustaining equipment, such as oxygen, and medical personnel has been explained to me and I fully understand them  (MAXINE CORRECT BOX BELOW)  [  ]  I consent to the stated transfer and to be transported by ambulance/helicopter  [  ]  I consent to the stated transfer, but refuse transportation by ambulance and accept full responsibility for my transportation by car  I understand the risks of non-ambulance transfers and I exonerate the Hospital and its staff from any deterioration in my condition that results from this refusal     X___________________________________________    DATE  22  TIME________  Signature of patient or legally responsible individual signing on patient behalf           RELATIONSHIP TO PATIENT_________________________          Provider Certification    NAME Dotty Mcknight                                         2000                              MRN [id number]    A medical screening exam was performed on the above named patient  Based on the examination:    Condition Necessitating Transfer The primary encounter diagnosis was Vaginal bleeding  Diagnoses of Abdominal cramping, UTI (urinary tract infection), and Vaginal yeast infection were also pertinent to this visit      Patient Condition: The patient has been stabilized such that within reasonable medical probability, no material deterioration of the patient condition or the condition of the unborn child(zelalem) is likely to result from the transfer    Reason for Transfer: Level of Care needed not available at this facility    Transfer Requirements: [address]   · Space available and qualified personnel available for treatment as acknowledged by    · Agreed to accept transfer and to provide appropriate medical treatment as acknowledged by       [facility]  · Appropriate medical records of the examination and treatment of the patient are provided at the time of transfer   [address] _______  · Transfer will be performed by qualified personnel from    and appropriate transfer equipment as required, including the use of necessary and appropriate life support measures  Provider Certification: I have examined the patient and explained the following risks and benefits of being transferred/refusing transfer to the patient/family:  Risk of worsening condition      Based on these reasonable risks and benefits to the patient and/or the unborn child(zelalem), and based upon the information available at the time of the patients examination, I certify that the medical benefits reasonably to be expected from the provision of appropriate medical treatments at another medical facility outweigh the increasing risks, if any, to the individuals medical condition, and in the case of labor to the unborn child, from effecting the transfer      X____________________________________________ DATE 08/13/22        TIME_______      ORIGINAL - SEND TO MEDICAL RECORDS   COPY - SEND WITH PATIENT DURING TRANSFER

## 2022-08-14 LAB — BACTERIA UR CULT: NORMAL

## 2022-10-12 PROBLEM — N39.0 URINARY TRACT INFECTION: Status: RESOLVED | Noted: 2022-08-13 | Resolved: 2022-10-12

## 2022-12-15 NOTE — NURSING NOTE
Pt is resting in bed at this time, pleasant on approach  Pt denies all s/s, says "I'm chilling, I'm good"  No needs at this time  Will continue to monitor  Pt c/o headache rated 6-10 and received tylenol 650 mg at 2143  Will monitor for effectiveness  Pt asked for water and when this writer returned she asked for the phone to be charged  Pt said she needs it back to call her family  Pt reported "my brother just got shot  They said he is okay but I need it back to call them"  Pt counseled  Pt polite and cooperative  Pt reports "I am okay because he is okay but I need to call them back"  Alert and oriented to person, place and time

## 2023-07-25 NOTE — ED NOTES
Pt received breakfast box       Clair Barahona  04/17/18 7839 [FreeTextEntry1] : He is awake and alert - oriented and fluent and following commands\par Speech is fluent and reception is full\par EOMI VFF\par Facial sensation is intact to light touch bilaterally\par Face is symmetric and tongue protrudes midline\par Carol is normal\par Strength is full in UE \par LE - proximally 5/5\par Knee extension bilaterally 5/5\par RIght knee flexion 5-/5\par Ankle flexion and extension bilaterally 5/5\par Katheryn and FNF normal\par Reflexes are 1+ symmetric\par He is ambulating independently and steadily.

## 2024-06-22 ENCOUNTER — HOSPITAL ENCOUNTER (EMERGENCY)
Facility: HOSPITAL | Age: 24
Discharge: HOME | End: 2024-06-22
Attending: EMERGENCY MEDICINE
Payer: MEDICAID

## 2024-06-22 VITALS
DIASTOLIC BLOOD PRESSURE: 92 MMHG | TEMPERATURE: 97.6 F | RESPIRATION RATE: 18 BRPM | SYSTOLIC BLOOD PRESSURE: 143 MMHG | OXYGEN SATURATION: 99 % | HEART RATE: 77 BPM

## 2024-06-22 DIAGNOSIS — O21.9 NAUSEA AND VOMITING IN PREGNANCY: ICD-10-CM

## 2024-06-22 DIAGNOSIS — A08.4 VIRAL GASTROENTERITIS: Primary | ICD-10-CM

## 2024-06-22 LAB
ALBUMIN SERPL BCG-MCNC: 3.9 G/DL (ref 3.5–5)
ALP SERPL-CCNC: 61 U/L (ref 34–104)
ALT SERPL W P-5'-P-CCNC: 13 U/L (ref 7–52)
ANION GAP SERPL CALCULATED.3IONS-SCNC: 11 MMOL/L (ref 4–13)
AST SERPL W P-5'-P-CCNC: 12 U/L (ref 13–39)
BASOPHILS # BLD MANUAL: 0 THOUSAND/UL (ref 0–0.1)
BASOPHILS NFR MAR MANUAL: 0 % (ref 0–1)
BILIRUB SERPL-MCNC: 0.45 MG/DL (ref 0.2–1)
BUN SERPL-MCNC: 6 MG/DL (ref 5–25)
CALCIUM SERPL-MCNC: 9.2 MG/DL (ref 8.4–10.2)
CHLORIDE SERPL-SCNC: 105 MMOL/L (ref 96–108)
CO2 SERPL-SCNC: 21 MMOL/L (ref 21–32)
CREAT SERPL-MCNC: 0.45 MG/DL (ref 0.6–1.3)
EOSINOPHIL # BLD MANUAL: 0 THOUSAND/UL (ref 0–0.4)
EOSINOPHIL NFR BLD MANUAL: 0 % (ref 0–6)
ERYTHROCYTE [DISTWIDTH] IN BLOOD BY AUTOMATED COUNT: 12.8 % (ref 11.6–15.1)
GFR SERPL CREATININE-BSD FRML MDRD: 143 ML/MIN/1.73SQ M
GLUCOSE SERPL-MCNC: 93 MG/DL (ref 65–140)
HCT VFR BLD AUTO: 36.6 % (ref 34.8–46.1)
HGB BLD-MCNC: 11.4 G/DL (ref 11.5–15.4)
LYMPHOCYTES # BLD AUTO: 0.34 THOUSAND/UL (ref 0.6–4.47)
LYMPHOCYTES # BLD AUTO: 4 % (ref 14–44)
MAGNESIUM SERPL-MCNC: 1.9 MG/DL (ref 1.9–2.7)
MCH RBC QN AUTO: 29.1 PG (ref 26.8–34.3)
MCHC RBC AUTO-ENTMCNC: 31.1 G/DL (ref 31.4–37.4)
MCV RBC AUTO: 93 FL (ref 82–98)
MONOCYTES # BLD AUTO: 0.17 THOUSAND/UL (ref 0–1.22)
MONOCYTES NFR BLD: 2 % (ref 4–12)
NEUTROPHILS # BLD MANUAL: 8.04 THOUSAND/UL (ref 1.85–7.62)
NEUTS BAND NFR BLD MANUAL: 3 % (ref 0–8)
NEUTS SEG NFR BLD AUTO: 91 % (ref 43–75)
PLATELET # BLD AUTO: 255 THOUSANDS/UL (ref 149–390)
PLATELET BLD QL SMEAR: ADEQUATE
PMV BLD AUTO: 9.3 FL (ref 8.9–12.7)
POTASSIUM SERPL-SCNC: 3.4 MMOL/L (ref 3.5–5.3)
PROT SERPL-MCNC: 7.2 G/DL (ref 6.4–8.4)
RBC # BLD AUTO: 3.92 MILLION/UL (ref 3.81–5.12)
RBC MORPH BLD: NORMAL
SODIUM SERPL-SCNC: 137 MMOL/L (ref 135–147)
WBC # BLD AUTO: 8.55 THOUSAND/UL (ref 4.31–10.16)

## 2024-06-22 PROCEDURE — 85007 BL SMEAR W/DIFF WBC COUNT: CPT | Performed by: STUDENT IN AN ORGANIZED HEALTH CARE EDUCATION/TRAINING PROGRAM

## 2024-06-22 PROCEDURE — 80053 COMPREHEN METABOLIC PANEL: CPT | Performed by: STUDENT IN AN ORGANIZED HEALTH CARE EDUCATION/TRAINING PROGRAM

## 2024-06-22 PROCEDURE — 96360 HYDRATION IV INFUSION INIT: CPT

## 2024-06-22 PROCEDURE — 76705 ECHO EXAM OF ABDOMEN: CPT | Performed by: EMERGENCY MEDICINE

## 2024-06-22 PROCEDURE — 76815 OB US LIMITED FETUS(S): CPT | Performed by: EMERGENCY MEDICINE

## 2024-06-22 PROCEDURE — 36415 COLL VENOUS BLD VENIPUNCTURE: CPT | Performed by: STUDENT IN AN ORGANIZED HEALTH CARE EDUCATION/TRAINING PROGRAM

## 2024-06-22 PROCEDURE — 99284 EMERGENCY DEPT VISIT MOD MDM: CPT

## 2024-06-22 PROCEDURE — 99284 EMERGENCY DEPT VISIT MOD MDM: CPT | Performed by: EMERGENCY MEDICINE

## 2024-06-22 PROCEDURE — 85027 COMPLETE CBC AUTOMATED: CPT | Performed by: STUDENT IN AN ORGANIZED HEALTH CARE EDUCATION/TRAINING PROGRAM

## 2024-06-22 PROCEDURE — 83735 ASSAY OF MAGNESIUM: CPT | Performed by: STUDENT IN AN ORGANIZED HEALTH CARE EDUCATION/TRAINING PROGRAM

## 2024-06-22 RX ORDER — ONDANSETRON 4 MG/1
4 TABLET, ORALLY DISINTEGRATING ORAL ONCE
Status: COMPLETED | OUTPATIENT
Start: 2024-06-22 | End: 2024-06-22

## 2024-06-22 RX ORDER — FAMOTIDINE 20 MG/1
10 TABLET, FILM COATED ORAL 2 TIMES DAILY
Qty: 10 TABLET | Refills: 0 | Status: SHIPPED | OUTPATIENT
Start: 2024-06-22 | End: 2024-07-02

## 2024-06-22 RX ORDER — ONDANSETRON 4 MG/1
4 TABLET, FILM COATED ORAL EVERY 6 HOURS
Qty: 12 TABLET | Refills: 0 | Status: SHIPPED | OUTPATIENT
Start: 2024-06-22

## 2024-06-22 RX ADMIN — ONDANSETRON 4 MG: 4 TABLET, ORALLY DISINTEGRATING ORAL at 11:27

## 2024-06-22 RX ADMIN — SODIUM CHLORIDE 500 ML: 0.9 INJECTION, SOLUTION INTRAVENOUS at 11:37

## 2024-06-22 NOTE — DISCHARGE INSTRUCTIONS
You were seen in the ED today for acute viral gastroenteritis. Your labs and imaging were not concerning for any larger problems.     Zofran and pepcid has been written to your pharmacy for nausea. Please take them on the bottle as prescribed.     Follow up with you OBGYN

## 2024-06-22 NOTE — ED ATTENDING ATTESTATION
6/22/2024  IPerfecto DO, saw and evaluated the patient. I have discussed the patient with the resident/non-physician practitioner and agree with the resident's/non-physician practitioner's findings, Plan of Care, and MDM as documented in the resident's/non-physician practitioner's note, except where noted. All available labs and Radiology studies were reviewed.  I was present for key portions of any procedure(s) performed by the resident/non-physician practitioner and I was immediately available to provide assistance.       At this point I agree with the current assessment done in the Emergency Department.  I have conducted an independent evaluation of this patient a history and physical is as follows:    23-year-old female presents for evaluation of multiple episodes of vomiting associated with continuous nausea since early this morning.  She woke feeling nauseated then felt acid brash and began vomiting.  She is approximately 19 weeks pregnant and has had appropriate prenatal care in [place] prior to moving here.  She did have a telephone intake call 2 days ago.  She had a normal IUP noted on ultrasound in [place].  She think she vomited about 7 times.  She had 1 episode of diarrhea.  She last ate a [employer] sandwich yesterday and has been unable to eat or drink this morning.  She is G2, P1 with no prior complication.  Her blood pressure was noted to be significantly elevated upon arrival but this decreased spontaneously during her stay.    No hematochezia, melena or hematemesis. No change in symptoms w/voiding. No recent travel or similar sick contacts. Denies f/c, CP, SOB. 12 system ROS o/w negative.    PE: NAD, appears mildly uncomfortable, alert; PERRL, EOMI; MMM, no posterior oropharyngeal exudate, edema or erythema; HRR, no murmur; lungs CTA w/o w/r/r, POx 94% on RA (nl); abdomen s/nd, no appreciable TTP, r/g/r, (-) Rovsing's, nl BS in all 4 quadrant, obese; (-) LE edema or calf TTP, FROM extremities  x4; skin p/w/d; CNs GI/NF, oriented.    MDM/DDx: Epigastric abdominal pain with n/v/d - GI virus, gastroenteritis, physiologic vomiting of pregnancy, less likely but at risk for abnormal electrolytes, LEX or dehydration, clinically doubt an acute abdomen.     I independently reviewed and interpreted ordered labs from this encounter.    A/P: Will check pelvic POCUS, abdominal labs, treat symptoms, reevaluate for further work up and disposition.        ED Course         Critical Care Time  POC Pelvic US    Date/Time: 6/22/2024 11:58 AM    Performed by: Perfecto Stearns DO  Authorized by: Perfecto Stearns DO    Patient location:  ED  Other Assisting Provider: Yes (comment) (Dr Sarah Aguirre)    Procedure details:     Exam Type:  Diagnostic    Indications: pregnant with abdominal pain      Assessment for: evaluate fetal viability      Technique:  Transabdominal obstetric (HCG+) exam    Image quality: diagnostic      Image availability:  Still images obtained  Uterine findings:     Intrauterine pregnancy: identified      Single gestation: identified      Fetal heart rate: identified      Fetal heart rate (bpm):  161  Other findings:     Free pelvic fluid: not identified      Free peritoneal fluid: not identified    Interpretation:     Ultrasound impressions: normal      Pregnancy findings: intrauterine pregnancy (IUP)      Estimated gestational age (weeks):  19  POC Biliary US    Date/Time: 6/22/2024 12:00 PM    Performed by: Perfecto Stearns DO  Authorized by: Perfecto Stearns DO    Patient location:  ED  Performed by:  Attending  Other Assisting Provider: Yes (comment) (Dr Sarah Aguirre)    Procedure details:     Exam Type:  Diagnostic    Indications: upper right quadrant abdominal pain and epigastric pain      Assessment for:  Cholelithiasis and cholecystitis    Views obtained: gallbladder (transverse and longitudinal) and common bile duct      Image quality: diagnostic      Image availability:  Still images obtained  Findings:      Cholelithiasis: not identified      Common bile duct:  Normal    Common bile duct diameter (mm):  4    Gallbladder wall:  Normal    Pericholecystic fluid: not identified      Sonographic Crawley's sign: negative      Polyps: not identified      Mass: not identified    Interpretation:     Biliary ultrasound impressions: normal gallbladder ultrasound

## 2024-06-22 NOTE — ED PROVIDER NOTES
History  Chief Complaint   Patient presents with    Vomiting During Pregnancy     Patient states she is about 5 months pregnant and states she woke up around 6 am with a lot of acid reflux and vomiting. Patient complains of nausea. Patient states she feels like the baby moving and denies any vaginal bleeding or discharge. Pain 4/10 at this time.      Job Hoffman is a 22 y/o  F at 18w6d with no PMHx who presents for vomiting. Patient states yesterday that she had a [employer] sandwich and a few hours later felt tired so she went to bed earlier than usual. This morning she had one episode of non-bloody diarrhea followed by non-bloody vomiting and chills. Associated with epigastric pain. She vomited a total of seven times this morning and stated the vomit became progressively more yellow in color. Last ate yesterday (24). Attempted to drink water this morning which also triggered N/V. Did not try any medication at home. Admits to fetal movement. Denies fever, chest pain, cough SOB, abdominal contractions, constipation, rashes, itching of the palms/soles of feet and hands, vaginal bleeding, vaginal discharge, leakage of fluid.     Of note, pt just moved here from [place]. Had regular prenatal care in [place] and is establishing with Da on  for her appointment. Pt states there have been no concerns with this pregnancy so far.     HTN noted in ED. Pt reports no hx of high BP or pre-eclampsia in prior pregnancy. She reports no issues with BP outside of pregnancy.         Prior to Admission Medications   Prescriptions Last Dose Informant Patient Reported? Taking?   Prenatal MV-Min-Fe Fum-FA-DHA (PRENATAL 1 PO)  Self Yes Yes   Sig: Take 1 tablet by mouth   methocarbamol (ROBAXIN) 500 mg tablet   No No   Sig: Take 1 tablet (500 mg total) by mouth 2 (two) times a day as needed for muscle spasms      Facility-Administered Medications: None       Past Medical History:   Diagnosis Date    ADHD (attention  deficit hyperactivity disorder)     Asthma        History reviewed. No pertinent surgical history.    History reviewed. No pertinent family history.  I have reviewed and agree with the history as documented.    E-Cigarette/Vaping    E-Cigarette Use Current Every Day User      E-Cigarette/Vaping Substances     Social History     Tobacco Use    Smoking status: Never    Smokeless tobacco: Never    Tobacco comments:     pt admits to using marijuana   Vaping Use    Vaping status: Every Day   Substance Use Topics    Alcohol use: Yes     Comment: socially    Drug use: Not Currently     Types: Marijuana     Comment: daily.        Review of Systems   Constitutional:  Positive for appetite change and chills. Negative for fever.   HENT:  Negative for congestion.    Respiratory:  Negative for cough and shortness of breath.    Cardiovascular:  Negative for chest pain.   Gastrointestinal:  Positive for abdominal pain (epigastric), diarrhea, nausea and vomiting. Negative for blood in stool and constipation.   Genitourinary:  Negative for dysuria, vaginal bleeding and vaginal discharge.   Skin:  Negative for rash.   Neurological:  Negative for syncope.   Psychiatric/Behavioral:  Negative for confusion.        Physical Exam  ED Triage Vitals   Temperature Pulse Respirations Blood Pressure SpO2   06/22/24 1057 06/22/24 1057 06/22/24 1057 06/22/24 1057 06/22/24 1057   97.6 °F (36.4 °C) 99 18 (!) 174/94 98 %      Temp Source Heart Rate Source Patient Position - Orthostatic VS BP Location FiO2 (%)   06/22/24 1057 06/22/24 1057 06/22/24 1145 06/22/24 1145 --   Temporal Monitor Sitting Left arm       Pain Score       06/22/24 1057       4             Orthostatic Vital Signs  Vitals:    06/22/24 1057 06/22/24 1145 06/22/24 1230   BP: (!) 174/94 145/92 143/92   Pulse: 99 92 77   Patient Position - Orthostatic VS:  Sitting        Physical Exam  Vitals reviewed.   Constitutional:       General: She is not in acute distress.     Appearance:  Normal appearance. She is obese. She is not ill-appearing or toxic-appearing.   HENT:      Head: Normocephalic and atraumatic.      Nose: Nose normal.   Eyes:      Conjunctiva/sclera: Conjunctivae normal.   Cardiovascular:      Rate and Rhythm: Normal rate and regular rhythm.      Heart sounds: Normal heart sounds. No murmur heard.     No friction rub. No gallop.   Pulmonary:      Effort: Pulmonary effort is normal. No respiratory distress.      Breath sounds: Normal breath sounds. No wheezing, rhonchi or rales.   Abdominal:      General: Bowel sounds are normal. There is no distension.      Tenderness: There is no abdominal tenderness. There is no guarding or rebound.      Comments: Gravid   Skin:     General: Skin is warm and dry.      Findings: No rash.   Neurological:      General: No focal deficit present.      Mental Status: She is alert and oriented to person, place, and time.      Gait: Gait normal.   Psychiatric:         Mood and Affect: Mood normal.         Behavior: Behavior normal.         Thought Content: Thought content normal.         ED Medications  Medications   ondansetron (ZOFRAN-ODT) dispersible tablet 4 mg (4 mg Oral Given 6/22/24 1127)   sodium chloride 0.9 % bolus 500 mL (0 mL Intravenous Stopped 6/22/24 1250)       Diagnostic Studies  Results Reviewed       Procedure Component Value Units Date/Time    RBC Morphology Reflex Test [[id number]] Collected: 06/22/24 1133    Lab Status: Final result Specimen: Blood from Arm, Right Updated: 06/22/24 1201    Comprehensive metabolic panel [[id number]]  (Abnormal) Collected: 06/22/24 1133    Lab Status: Final result Specimen: Blood from Arm, Right Updated: 06/22/24 1154     Sodium 137 mmol/L      Potassium 3.4 mmol/L      Chloride 105 mmol/L      CO2 21 mmol/L      ANION GAP 11 mmol/L      BUN 6 mg/dL      Creatinine 0.45 mg/dL      Glucose 93 mg/dL      Calcium 9.2 mg/dL      AST 12 U/L      ALT 13 U/L      Alkaline Phosphatase 61 U/L      Total Protein  7.2 g/dL      Albumin 3.9 g/dL      Total Bilirubin 0.45 mg/dL      eGFR 143 ml/min/1.73sq m     Narrative:      National Kid[employer] Disease Foundation guidelines for Chronic Kidney Disease (CKD):     Stage 1 with normal or high GFR (GFR > 90 mL/min/1.73 square meters)    Stage 2 Mild CKD (GFR = 60-89 mL/min/1.73 square meters)    Stage 3A Moderate CKD (GFR = 45-59 mL/min/1.73 square meters)    Stage 3B Moderate CKD (GFR = 30-44 mL/min/1.73 square meters)    Stage 4 Severe CKD (GFR = 15-29 mL/min/1.73 square meters)    Stage 5 End Stage CKD (GFR <15 mL/min/1.73 square meters)  Note: GFR calculation is accurate only with a steady state creatinine    Magnesium [[id number]]  (Normal) Collected: 06/22/24 1133    Lab Status: Final result Specimen: Blood from Arm, Right Updated: 06/22/24 1154     Magnesium 1.9 mg/dL     Manual Differential(PHLEBS Do Not Order) [[id number]]  (Abnormal) Collected: 06/22/24 1133    Lab Status: Final result Specimen: Blood from Arm, Right Updated: 06/22/24 1148     Segmented % 91 %      Bands % 3 %      Lymphocytes % 4 %      Monocytes % 2 %      Eosinophils % 0 %      Basophils % 0 %      Absolute Neutrophils 8.04 Thousand/uL      Absolute Lymphocytes 0.34 Thousand/uL      Absolute Monocytes 0.17 Thousand/uL      Absolute Eosinophils 0.00 Thousand/uL      Absolute Basophils 0.00 Thousand/uL      Total Counted --     RBC Morphology Normal     Platelet Estimate Adequate    CBC and differential [[id number]]  (Abnormal) Collected: 06/22/24 1133    Lab Status: Final result Specimen: Blood from Arm, Right Updated: 06/22/24 1148     WBC 8.55 Thousand/uL      RBC 3.92 Million/uL      Hemoglobin 11.4 g/dL      Hematocrit 36.6 %      MCV 93 fL      MCH 29.1 pg      MCHC 31.1 g/dL      RDW 12.8 %      MPV 9.3 fL      Platelets 255 Thousands/uL     Narrative:      This is an appended report.  These results have been appended to a previously verified report.                   No orders to display          Procedures  See attending attestation note for ultrasound report and media for pictures      ED Course  ED Course as of 24 1311   Sat 2024   1057 Blood Pressure(!): 174/94   1057 Temperature: 97.6 °F (36.4 °C)   1057 Pulse: 99   1057 SpO2: 98 %   1133 CBC and differential(!)  CBC with hemoglobin of 11.4, WNL for pregnancy    1133 Comprehensive metabolic panel(!)  Potassium slightly low a 3.4. Liver enzymes WNL. Sodium WNL.   1133 Magnesium  Magnesium WNL   1145 Blood Pressure: 145/92   1145 Pulse: 92   1229 Pt reports epigastric pain improved with Zofran   1230 Blood Pressure: 143/92                             SBIRT 22yo+      Flowsheet Row Most Recent Value   Initial Alcohol Screen: US AUDIT-C     1. How often do you have a drink containing alcohol? 0 Filed at: 2024 1057   2. How many drinks containing alcohol do you have on a typical day you are drinking?  0 Filed at: 2024 1057   3a. Male UNDER 65: How often do you have five or more drinks on one occasion? 0 Filed at: 2024 1057   3b. FEMALE Any Age, or MALE 65+: How often do you have 4 or more drinks on one occassion? 0 Filed at: 2024 1057   Audit-C Score 0 Filed at: 2024 1057   MILA: How many times in the past year have you...    Used an illegal drug or used a prescription medication for non-medical reasons? Never Filed at: 2024 1057                  Medical Decision Making  22 y/o  F at 18w6d with no PMHx who presents for vomiting. Differentials include, but not limited to, acute viral gastroenteritis, cholecystitis, acute fatty liver of pregnancy, intrahepatic cholestasis of pregnancy, and pregnancy induced nausea/vomiting. CBC and CMP show normal liver enzymes and no large electrolyte abnormalities. Potassium slightly low at 3.4, low risk for worsening/contributing to the acute presentation. Bedside ultrasound showed viable fetus with detectable FHR of 161 and frequent movement. Gallbladder on bedside  ultrasound with no apparent stones or sludge. Given findings of history, normal LFTs, normal gallbladder on US this is likely acute viral gastroenteritis. Fluids, zofran given in ED. Will discharge with zofran and H2 blocker. Discussed treatment and plan with patient who is agreeable and verbalizes understanding.     Amount and/or Complexity of Data Reviewed  Labs: ordered. Decision-making details documented in ED Course.    Risk  OTC drugs.  Prescription drug management.  Risk Details: Pt treated for acute viral gastroenteritis   Low risk for acute fatty liver of pregnancy, intrahepatic cholestasis of pregnancy   Given fluids, zofran in ED   Zofran and pepcid written for discharge  Return precautions provided   Follow up with OB/GYN as scheduled           Disposition  Final diagnoses:   Viral gastroenteritis   Nausea and vomiting in pregnancy     Time reflects when diagnosis was documented in both MDM as applicable and the Disposition within this note       Time User Action Codes Description Comment    6/22/2024 12:58 PM Sarah Aguirre Add [A08.4] Viral gastroenteritis     6/22/2024 12:58 PM Sarah Aguirre Add [O21.9] Nausea and vomiting in pregnancy           ED Disposition       ED Disposition   Discharge    Condition   Stable    Date/Time   Sat Jun 22, 2024 1257    Comment   Job Mcknight discharge to home/self care.                   Follow-up Information       Follow up With Specialties Details Why Contact Info    OBGYN  Go to  schedule appointment             Discharge Medication List as of 6/22/2024  1:00 PM        CONTINUE these medications which have NOT CHANGED    Details   Prenatal MV-Min-Fe Fum-FA-DHA (PRENATAL 1 PO) Take 1 tablet by mouth, Historical Med      methocarbamol (ROBAXIN) 500 mg tablet Take 1 tablet (500 mg total) by mouth 2 (two) times a day as needed for muscle spasms, Starting Sun 3/20/2022, Normal           No discharge procedures on file.    PDMP Review         Value Time User     PDMP Reviewed  Yes 9/24/2021  8:42 AM Ebonie Estevez MD             ED Provider  Attending physically available and evaluated Job Mcknight. I managed the patient along with the ED Attending.    Electronically Signed by:  Sarah Aguirre DO   PGY-1 [facility]   [facility]      Sarah Aguirre DO  06/22/24 0601

## 2025-05-21 ENCOUNTER — HOSPITAL ENCOUNTER (EMERGENCY)
Facility: HOSPITAL | Age: 25
Discharge: HOME | End: 2025-05-21
Attending: EMERGENCY MEDICINE
Payer: COMMERCIAL

## 2025-05-21 VITALS
TEMPERATURE: 98 F | HEART RATE: 78 BPM | HEIGHT: 64 IN | DIASTOLIC BLOOD PRESSURE: 92 MMHG | OXYGEN SATURATION: 90 % | SYSTOLIC BLOOD PRESSURE: 177 MMHG | BODY MASS INDEX: 44.65 KG/M2 | RESPIRATION RATE: 18 BRPM

## 2025-05-21 DIAGNOSIS — M54.9 MUSCULOSKELETAL BACK PAIN: ICD-10-CM

## 2025-05-21 DIAGNOSIS — M54.50 LOW BACK PAIN: ICD-10-CM

## 2025-05-21 DIAGNOSIS — S39.012A STRAIN OF LUMBAR REGION, INITIAL ENCOUNTER: Primary | ICD-10-CM

## 2025-05-21 LAB
EXT PREGNANCY TEST URINE: NEGATIVE
EXT. CONTROL: NORMAL

## 2025-05-21 PROCEDURE — 99284 EMERGENCY DEPT VISIT MOD MDM: CPT | Performed by: EMERGENCY MEDICINE

## 2025-05-21 PROCEDURE — 81025 URINE PREGNANCY TEST: CPT | Performed by: EMERGENCY MEDICINE

## 2025-05-21 PROCEDURE — 96372 THER/PROPH/DIAG INJ SC/IM: CPT

## 2025-05-21 PROCEDURE — 99283 EMERGENCY DEPT VISIT LOW MDM: CPT

## 2025-05-21 RX ORDER — KETOROLAC TROMETHAMINE 30 MG/ML
30 INJECTION, SOLUTION INTRAMUSCULAR; INTRAVENOUS ONCE
Status: COMPLETED | OUTPATIENT
Start: 2025-05-21 | End: 2025-05-21

## 2025-05-21 RX ORDER — PREDNISONE 20 MG/1
60 TABLET ORAL ONCE
Status: COMPLETED | OUTPATIENT
Start: 2025-05-21 | End: 2025-05-21

## 2025-05-21 RX ORDER — METHOCARBAMOL 500 MG/1
500 TABLET, FILM COATED ORAL 2 TIMES DAILY
Qty: 20 TABLET | Refills: 0 | Status: SHIPPED | OUTPATIENT
Start: 2025-05-21

## 2025-05-21 RX ORDER — METHOCARBAMOL 500 MG/1
500 TABLET, FILM COATED ORAL ONCE
Status: COMPLETED | OUTPATIENT
Start: 2025-05-21 | End: 2025-05-21

## 2025-05-21 RX ORDER — METHYLPREDNISOLONE 4 MG/1
TABLET ORAL
Qty: 21 TABLET | Refills: 0 | Status: SHIPPED | OUTPATIENT
Start: 2025-05-21

## 2025-05-21 RX ADMIN — KETOROLAC TROMETHAMINE 30 MG: 30 INJECTION, SOLUTION INTRAMUSCULAR at 22:07

## 2025-05-21 RX ADMIN — PREDNISONE 60 MG: 20 TABLET ORAL at 22:06

## 2025-05-21 RX ADMIN — METHOCARBAMOL 500 MG: 500 TABLET ORAL at 22:06

## 2025-05-22 ENCOUNTER — TELEPHONE (OUTPATIENT)
Dept: PHYSICAL THERAPY | Facility: OTHER | Age: 25
End: 2025-05-22

## 2025-05-23 NOTE — ED PROVIDER NOTES
Time reflects when diagnosis was documented in both MDM as applicable and the Disposition within this note       Time User Action Codes Description Comment    5/21/2025 10:00 PM Dario Carnes Add [S39.012A] Strain of lumbar region, initial encounter     5/21/2025 10:00 PM Dario Carnes Add [M54.50] Low back pain     5/21/2025 10:00 PM Dario Carnes Add [M54.9] Musculoskeletal back pain           ED Disposition       ED Disposition   Discharge    Condition   Stable    Date/Time   Wed May 21, 2025 10:00 PM    Comment   Job Mcknight discharge to home/self care.                   Assessment & Plan       Medical Decision Making  24-year-old female presenting with low back pain after straining her low back while lifting furniture at home 5 days ago.  No bowel or bladder incontinence or retention.  No paresthesia.  No zoster rash.  No weakness or numbness.  No radicular complaints.  Not immune compromised immunosuppressed.  No fevers or chills.    Will treat with prednisone, Robaxin, [facility].  Reviewed return precautions.  Work note.    Problems Addressed:  Low back pain: acute illness or injury  Musculoskeletal back pain: acute illness or injury  Strain of lumbar region, initial encounter: acute illness or injury    Amount and/or Complexity of Data Reviewed  Labs: ordered. Decision-making details documented in ED Course.    Risk  OTC drugs.  Prescription drug management.             Medications   ketorolac (TORADOL) injection 30 mg (30 mg Intramuscular Given 5/21/25 2207)   predniSONE tablet 60 mg (60 mg Oral Given 5/21/25 2206)   methocarbamol (ROBAXIN) tablet 500 mg (500 mg Oral Given 5/21/25 2206)       ED Risk Strat Scores          I personally discussed return precautions with this patient and family. I provided the patient with written discharge instructions and particularly highlighted specific areas of interest to this patient, including but not  limited to: medications for symptom managment, follow up recommendations, and return precautions. Patient and family are in agreement with this plan as outlined above.          No data recorded                            History of Present Illness       Chief Complaint   Patient presents with    Back Pain     Pt coming in with mid back pain that started Saturday after lifting her couch. Pt took tylenol and motrin with no relief         Past Medical History[1]   Past Surgical History[2]   Family History[3]   Social History[4]   E-Cigarette/Vaping    E-Cigarette Use Former User       E-Cigarette/Vaping Substances      I have reviewed and agree with the history as documented.     Patient is a 24-year-old female complaining of low back pain x 5 days.  Patient states she was lifting a couch at home while cleaning and shortly thereafter felt a strain sensation in her low back.  Does not radiate down to her buttocks or down either leg.  No bowel or bladder incontinence or retention.  No perineal anesthesia.  No history of back issues.  States pain is worse when bending forward and try to get comfortable at night to sleep.  No dysuria or hematuria.  No flank pain or abdominal pain.  No numbness or weakness.  No falls or syncopal episodes.      Back Pain  Associated symptoms: no abdominal pain, no chest pain, no dysuria, no fever, no headaches and no pelvic pain        Review of Systems   Constitutional:  Negative for appetite change, chills, fatigue, fever and unexpected weight change.   HENT:  Negative for congestion, ear pain, rhinorrhea and sore throat.    Eyes:  Negative for pain and visual disturbance.   Respiratory:  Negative for cough, chest tightness, shortness of breath and wheezing.    Cardiovascular:  Negative for chest pain, palpitations and leg swelling.   Gastrointestinal:  Negative for abdominal pain, constipation, diarrhea, nausea and vomiting.   Genitourinary:  Negative for difficulty urinating, dysuria,  frequency, hematuria, menstrual problem, pelvic pain, vaginal bleeding and vaginal discharge.   Musculoskeletal:  Positive for back pain. Negative for arthralgias and neck pain.   Skin:  Negative for color change and rash.   Neurological:  Negative for dizziness, seizures, syncope, light-headedness and headaches.   Psychiatric/Behavioral:  Negative for sleep disturbance.    All other systems reviewed and are negative.          Objective       ED Triage Vitals [05/21/25 2103]   Temperature Pulse Blood Pressure Respirations SpO2 Patient Position - Orthostatic VS   98.3 °F (36.8 °C) 89 158/99 18 98 % Sitting      Temp Source Heart Rate Source BP Location FiO2 (%) Pain Score    Temporal -- Right arm -- 10 - Worst Possible Pain      Vitals      Date and Time Temp Pulse SpO2 Resp BP Pain Score FACES Pain Rating User   05/21/25 2207 -- -- -- -- -- 9 -- SH   05/21/25 2204 98 °F (36.7 °C) 78 90 % -- 177/92 -- -- AK   05/21/25 2103 98.3 °F (36.8 °C) 89 98 % 18 158/99 10 - Worst Possible Pain --             Physical Exam  Vitals and nursing note reviewed.   Constitutional:       General: She is not in acute distress.     Appearance: Normal appearance. She is well-developed. She is obese. She is not ill-appearing, toxic-appearing or diaphoretic.   HENT:      Head: Normocephalic and atraumatic.      Nose: Nose normal.      Mouth/Throat:      Mouth: Mucous membranes are moist.      Pharynx: Oropharynx is clear.     Eyes:      General: No scleral icterus.     Extraocular Movements: Extraocular movements intact.      Conjunctiva/sclera: Conjunctivae normal.       Cardiovascular:      Rate and Rhythm: Normal rate and regular rhythm.      Pulses: Normal pulses.      Heart sounds: Normal heart sounds. No murmur heard.     No friction rub. No gallop.   Pulmonary:      Effort: Pulmonary effort is normal. No respiratory distress.      Breath sounds: Normal breath sounds. No wheezing or rales.   Chest:      Chest wall: No tenderness.    Abdominal:      General: Bowel sounds are normal. There is no distension.      Palpations: Abdomen is soft. There is no mass.      Tenderness: There is no abdominal tenderness. There is no right CVA tenderness, left CVA tenderness, guarding or rebound.      Hernia: No hernia is present.     Musculoskeletal:         General: Tenderness present. No deformity. Normal range of motion.      Cervical back: Normal range of motion and neck supple. No rigidity or tenderness.        Back:       Right lower leg: No edema.      Left lower leg: No edema.      Comments: Patient able to extend at the waist but has increased tenderness while bending forward and flexion.  Negative straight leg raise test bilaterally.  Able to stand on 1 foot bilaterally.  5 out of 5 strength bilateral lower extremities.   Lymphadenopathy:      Cervical: No cervical adenopathy.     Skin:     General: Skin is warm and dry.      Capillary Refill: Capillary refill takes less than 2 seconds.      Coloration: Skin is not jaundiced or pale.      Findings: No bruising, erythema, lesion or rash.     Neurological:      General: No focal deficit present.      Mental Status: She is alert and oriented to person, place, and time. Mental status is at baseline.     Psychiatric:         Mood and Affect: Mood normal.         Behavior: Behavior normal.         Results Reviewed       Procedure Component Value Units Date/Time    POCT pregnancy, urine [[id number]]  (Normal) Collected: 05/21/25 2150    Lab Status: Final result Updated: 05/21/25 2150     EXT Preg Test, Ur Negative     Control Valid            No orders to display       Procedures    ED Medication and Procedure Management   Prior to Admission Medications   Prescriptions Last Dose Informant Patient Reported? Taking?   Prenatal MV-Min-Fe Fum-FA-DHA (PRENATAL 1 PO)  Self Yes No   Sig: Take 1 tablet by mouth   famotidine (PEPCID) 20 mg tablet   No No   Sig: Take 0.5 tablets (10 mg total) by mouth 2 (two) times  a day for 10 days   methocarbamol (ROBAXIN) 500 mg tablet   No No   Sig: Take 1 tablet (500 mg total) by mouth 2 (two) times a day as needed for muscle spasms   ondansetron (ZOFRAN) 4 mg tablet   No No   Sig: Take 1 tablet (4 mg total) by mouth every 6 (six) hours      Facility-Administered Medications: None     Discharge Medication List as of 5/21/2025 10:02 PM        START taking these medications    Details   !! methocarbamol (ROBAXIN) 500 mg tablet Take 1 tablet (500 mg total) by mouth 2 (two) times a day, Starting Wed 5/21/2025, Normal      methylPREDNISolone 4 MG tablet therapy pack Use as directed on package, Normal       !! - Potential duplicate medications found. Please discuss with provider.        CONTINUE these medications which have NOT CHANGED    Details   famotidine (PEPCID) 20 mg tablet Take 0.5 tablets (10 mg total) by mouth 2 (two) times a day for 10 days, Starting Sat 6/22/2024, Until Tue 7/2/2024, Normal      !! methocarbamol (ROBAXIN) 500 mg tablet Take 1 tablet (500 mg total) by mouth 2 (two) times a day as needed for muscle spasms, Starting Sun 3/20/2022, Normal      ondansetron (ZOFRAN) 4 mg tablet Take 1 tablet (4 mg total) by mouth every 6 (six) hours, Starting Sat 6/22/2024, Normal      Prenatal MV-Min-Fe Fum-FA-DHA (PRENATAL 1 PO) Take 1 tablet by mouth, Historical Med       !! - Potential duplicate medications found. Please discuss with provider.          ED SEPSIS DOCUMENTATION   Time reflects when diagnosis was documented in both MDM as applicable and the Disposition within this note       Time User Action Codes Description Comment    5/21/2025 10:00 PM Dario Carnes Add [S39.012A] Strain of lumbar region, initial encounter     5/21/2025 10:00 PM Dario Carnes Add [M54.50] Low back pain     5/21/2025 10:00 PM Dario Carnes Add [M54.9] Musculoskeletal back pain                    [1]   Past Medical History:  Diagnosis Date    ADHD (attention deficit hyperactivity  disorder)     Asthma    [2] No past surgical history on file.  [3] No family history on file.  [4]   Social History  Tobacco Use    Smoking status: Never    Smokeless tobacco: Never    Tobacco comments:     pt admits to using marijuana   Vaping Use    Vaping status: Former   Substance Use Topics    Alcohol use: Yes     Comment: socially    Drug use: Not Currently     Types: Marijuana     Comment: daily.        Dario Carnes,   05/23/25 0706

## 2025-06-15 ENCOUNTER — HOSPITAL ENCOUNTER (EMERGENCY)
Facility: HOSPITAL | Age: 25
Discharge: HOME | End: 2025-06-15
Attending: EMERGENCY MEDICINE
Payer: MEDICARE

## 2025-06-15 VITALS
SYSTOLIC BLOOD PRESSURE: 190 MMHG | RESPIRATION RATE: 18 BRPM | BODY MASS INDEX: 50.02 KG/M2 | OXYGEN SATURATION: 98 % | WEIGHT: 293 LBS | DIASTOLIC BLOOD PRESSURE: 86 MMHG | HEIGHT: 64 IN | TEMPERATURE: 98 F | HEART RATE: 74 BPM

## 2025-06-15 DIAGNOSIS — R21 SKIN RASH: Primary | ICD-10-CM

## 2025-06-15 DIAGNOSIS — A54.9 GONORRHEA: ICD-10-CM

## 2025-06-15 DIAGNOSIS — Z11.3 SCREENING EXAMINATION FOR STI: ICD-10-CM

## 2025-06-15 PROBLEM — N89.8 VAGINAL DISCHARGE DURING PREGNANCY IN THIRD TRIMESTER: Status: RESOLVED | Noted: 2024-10-24 | Resolved: 2025-06-15

## 2025-06-15 PROBLEM — O26.893 VAGINAL DISCHARGE DURING PREGNANCY IN THIRD TRIMESTER: Status: RESOLVED | Noted: 2024-10-24 | Resolved: 2025-06-15

## 2025-06-15 PROBLEM — O23.592 TRICHOMONAL VAGINITIS DURING PREGNANCY IN SECOND TRIMESTER: Status: RESOLVED | Noted: 2024-07-05 | Resolved: 2025-06-15

## 2025-06-15 PROBLEM — O99.210 OBESITY IN PREGNANCY, ANTEPARTUM: Status: RESOLVED | Noted: 2024-07-15 | Resolved: 2025-06-15

## 2025-06-15 PROBLEM — A59.01 TRICHOMONAL VAGINITIS DURING PREGNANCY IN SECOND TRIMESTER: Status: RESOLVED | Noted: 2024-07-05 | Resolved: 2025-06-15

## 2025-06-15 PROBLEM — O36.5990 FETAL GROWTH RESTRICTION ANTEPARTUM: Status: RESOLVED | Noted: 2024-10-07 | Resolved: 2025-06-15

## 2025-06-15 PROBLEM — O99.320 MARIJUANA USE DURING PREGNANCY: Status: RESOLVED | Noted: 2024-07-15 | Resolved: 2025-06-15

## 2025-06-15 PROBLEM — R10.2 PELVIC PRESSURE IN PREGNANCY: Status: RESOLVED | Noted: 2024-10-24 | Resolved: 2025-06-15

## 2025-06-15 PROBLEM — F12.90 MARIJUANA USE DURING PREGNANCY: Status: RESOLVED | Noted: 2024-07-15 | Resolved: 2025-06-15

## 2025-06-15 PROBLEM — O26.899 PELVIC PRESSURE IN PREGNANCY: Status: RESOLVED | Noted: 2024-10-24 | Resolved: 2025-06-15

## 2025-06-15 PROBLEM — Z87.59 HISTORY OF GESTATIONAL HYPERTENSION: Status: ACTIVE | Noted: 2024-07-15

## 2025-06-15 PROBLEM — F12.10 TETRAHYDROCANNABINOL (THC) USE DISORDER, MILD, ABUSE: Status: RESOLVED | Noted: 2024-07-05 | Resolved: 2025-06-15

## 2025-06-15 PROBLEM — E66.01 OBESITY, MORBID (MORE THAN 100 LBS OVER IDEAL WEIGHT OR BMI > 40) (HCC): Status: ACTIVE | Noted: 2024-07-05

## 2025-06-15 LAB
BACTERIA UR QL AUTO: ABNORMAL /HPF
BILIRUB UR QL STRIP: NEGATIVE
CLARITY UR: ABNORMAL
COLOR UR: YELLOW
EXT PREGNANCY TEST URINE: NEGATIVE
EXT. CONTROL: NORMAL
GLUCOSE UR STRIP-MCNC: NEGATIVE MG/DL
HGB UR QL STRIP.AUTO: NEGATIVE
HIV 1+2 AB+HIV1 P24 AG SERPL QL IA: NORMAL
HIV1 P24 AG SER QL: NORMAL
KETONES UR STRIP-MCNC: NEGATIVE MG/DL
LEUKOCYTE ESTERASE UR QL STRIP: ABNORMAL
MUCOUS THREADS UR QL AUTO: ABNORMAL
NITRITE UR QL STRIP: NEGATIVE
NON-SQ EPI CELLS URNS QL MICRO: ABNORMAL /HPF
PH UR STRIP.AUTO: 7 [PH]
PROT UR STRIP-MCNC: ABNORMAL MG/DL
RBC #/AREA URNS AUTO: ABNORMAL /HPF
SP GR UR STRIP.AUTO: 1.02 (ref 1–1.03)
UROBILINOGEN UR STRIP-ACNC: <2 MG/DL
WBC #/AREA URNS AUTO: ABNORMAL /HPF

## 2025-06-15 PROCEDURE — 87806 HIV AG W/HIV1&2 ANTB W/OPTIC: CPT | Performed by: PHYSICIAN ASSISTANT

## 2025-06-15 PROCEDURE — 81025 URINE PREGNANCY TEST: CPT | Performed by: PHYSICIAN ASSISTANT

## 2025-06-15 PROCEDURE — 36415 COLL VENOUS BLD VENIPUNCTURE: CPT | Performed by: PHYSICIAN ASSISTANT

## 2025-06-15 PROCEDURE — 99284 EMERGENCY DEPT VISIT MOD MDM: CPT | Performed by: EMERGENCY MEDICINE

## 2025-06-15 PROCEDURE — 86780 TREPONEMA PALLIDUM: CPT | Performed by: PHYSICIAN ASSISTANT

## 2025-06-15 PROCEDURE — 87491 CHLMYD TRACH DNA AMP PROBE: CPT | Performed by: PHYSICIAN ASSISTANT

## 2025-06-15 PROCEDURE — 81001 URINALYSIS AUTO W/SCOPE: CPT | Performed by: PHYSICIAN ASSISTANT

## 2025-06-15 PROCEDURE — 87591 N.GONORRHOEAE DNA AMP PROB: CPT | Performed by: PHYSICIAN ASSISTANT

## 2025-06-15 PROCEDURE — 99282 EMERGENCY DEPT VISIT SF MDM: CPT

## 2025-06-15 RX ORDER — CLOTRIMAZOLE 1 %
CREAM (GRAM) TOPICAL
Qty: 45 G | Refills: 0 | Status: SHIPPED | OUTPATIENT
Start: 2025-06-15

## 2025-06-15 RX ORDER — MEDROXYPROGESTERONE ACETATE 150 MG/ML
150 INJECTION, SUSPENSION INTRAMUSCULAR
COMMUNITY
Start: 2025-04-16 | End: 2026-03-18

## 2025-06-15 NOTE — ED PROVIDER NOTES
Time reflects when diagnosis was documented in both MDM as applicable and the Disposition within this note       Time User Action Codes Description Comment    6/15/2025  2:20 PM Hui Wright [R21] Skin rash     6/15/2025  2:20 PM Hui Wright Add [Z11.3] Screening examination for STI           ED Disposition       ED Disposition   Discharge    Condition   Stable    Date/Time   Sun Roney 15, 2025  2:20 PM    Comment   Job Mcknight discharge to home/self care.                   Assessment & Plan       Medical Decision Making  25 yo female presenting for evaluation of rash.  Clinical exam c/w appearance of ringworm.  Reviewed usual course and treatment.  Advised not a specific blood test for this and it is a clinical diagnosis.  Will provide topical antifungal.  Pt also requesting STI testing.  No clinical symptoms.  No known exposures.  No need for empiric treatment at this time, will follow up on results, treat as indicated.  She was encourage to activate mychart and follow up on test results.  BP mildly elevated, advised to monitor and recheck with PCP.    Reviewed symptomatic management.  OTC meds reviewed.  Anticipatory guidance.  Advised recheck with PCP or return to ER as needed.  Strict return precautions outlined.  Patient voiced understanding and had no further questions.    Please refer to above ER course for further details/discussion.      Problems Addressed:  Screening examination for STI: acute illness or injury  Skin rash: acute illness or injury    Amount and/or Complexity of Data Reviewed  External Data Reviewed: labs and notes.  Labs: ordered. Decision-making details documented in ED Course.    Risk  OTC drugs.  Prescription drug management.        ED Course as of 06/15/25 1523   Sun Roney 15, 2025   1417 Prior STI testing 11/6/24 to include HIV, hepatitis, RPR were negative.   1444 PREGNANCY TEST URINE: Negative   1500 Leukocytes, UA(!): Moderate   1500 POCT URINE PROTEIN(!): Trace  "  1500 WBC, UA(!): 4-10   1500 Epithelial Cells: Occasional  Likely contaminated specimen       Medications - No data to display    ED Risk Strat Scores                    No data recorded        SBIRT 22yo+      Flowsheet Row Most Recent Value   Initial Alcohol Screen: US AUDIT-C     1. How often do you have a drink containing alcohol? 0 Filed at: 06/15/2025 1401   2. How many drinks containing alcohol do you have on a typical day you are drinking?  0 Filed at: 06/15/2025 1401   3b. FEMALE Any Age, or MALE 65+: How often do you have 4 or more drinks on one occassion? 0 Filed at: 06/15/2025 1401   Audit-C Score 0 Filed at: 06/15/2025 1401   MILA: How many times in the past year have you...    Used an illegal drug or used a prescription medication for non-medical reasons? Never Filed at: 06/15/2025 1401                            History of Present Illness       Chief Complaint   Patient presents with    Rash     Pt has a rash under right upper arm. Since last week. Also on her right thigh. Pt has been very itchy. Pt has not been putting any meds on her arm or thigh. P \"requesting STI tests\", explained that provider would look at her and decide what tests are needed.        Past Medical History[1]   Past Surgical History[2]   Family History[3]   Social History[4]   E-Cigarette/Vaping    E-Cigarette Use Former User       E-Cigarette/Vaping Substances      I have reviewed and agree with the history as documented.     24 year old female presenting for evaluation of a rash.  Pt reports she first noticed an area under her right upper arm last week.  She notes it is itchy at times.  Noticed a similar rash on her right thigh.  She questions possible exposure from the laundromat.  Did use a new detergent.  Denies any other new exposures such as soaps, lotions, meds, etc  No one else at home with similar rash.  Denies fever, chills, cough, congestion, sore throat or recent illness.  No specific treatments tried.  No reported " aggravating or alleviating factors.  Rash unchanged since onset.  Pt notes she has been researching things online and concerned about sexually transmitted infection.  She denies any new partners.   Denies any vaginal discharge or urinary symptoms.  Denies pregnancy, on depo shot.        History provided by:  Patient and medical records   used: No    Rash  Quality: itchiness    Chronicity:  New  Context: not animal contact, not chemical exposure, not exposure to similar rash, not hot tub use, not medications, not plant contact, not pregnancy and not sick contacts    Relieved by:  Nothing  Worsened by:  Nothing  Ineffective treatments:  None tried  Associated symptoms: no abdominal pain, no diarrhea, no fatigue, no fever, no headaches, no nausea, no shortness of breath, no sore throat, no URI, not vomiting and not wheezing        Review of Systems   Constitutional: Negative.  Negative for chills, fatigue and fever.   HENT: Negative.  Negative for congestion, rhinorrhea and sore throat.    Eyes: Negative.  Negative for visual disturbance.   Respiratory: Negative.  Negative for cough, shortness of breath and wheezing.    Cardiovascular: Negative.  Negative for chest pain, palpitations and leg swelling.   Gastrointestinal: Negative.  Negative for abdominal pain, diarrhea, nausea and vomiting.   Genitourinary: Negative.  Negative for dysuria, flank pain, frequency, hematuria and vaginal discharge.   Musculoskeletal: Negative.  Negative for back pain.   Skin:  Positive for rash.   Neurological: Negative.  Negative for dizziness, light-headedness and headaches.   Psychiatric/Behavioral: Negative.     All other systems reviewed and are negative.          Objective       ED Triage Vitals [06/15/25 1358]   Temperature Pulse Blood Pressure Respirations SpO2 Patient Position - Orthostatic VS   98 °F (36.7 °C) 74 (!) 193/89 18 98 % Sitting      Temp Source Heart Rate Source BP Location FiO2 (%) Pain Score     Temporal Monitor Left arm -- No Pain      Vitals      Date and Time Temp Pulse SpO2 Resp BP Pain Score FACES Pain Rating User   06/15/25 1443 -- -- -- -- 190/86 -- -- RJY   06/15/25 1358 98 °F (36.7 °C) 74 98 % 18 193/89 No Pain -- RJY            Physical Exam  Vitals and nursing note reviewed.   Constitutional:       General: She is awake. She is not in acute distress.     Appearance: She is well-developed. She is obese. She is not ill-appearing or toxic-appearing.   HENT:      Head: Normocephalic and atraumatic.      Right Ear: Hearing and external ear normal.      Left Ear: Hearing and external ear normal.      Nose: Nose normal.      Mouth/Throat:      Mouth: Mucous membranes are moist.      Pharynx: Oropharynx is clear.     Eyes:      General: Lids are normal. No scleral icterus.     Conjunctiva/sclera: Conjunctivae normal.     Neck:      Trachea: Trachea and phonation normal.     Cardiovascular:      Rate and Rhythm: Normal rate and regular rhythm.      Pulses: Normal pulses.   Pulmonary:      Effort: Pulmonary effort is normal. No tachypnea or respiratory distress.      Breath sounds: Normal breath sounds.   Abdominal:      Palpations: Abdomen is soft.      Tenderness: There is no abdominal tenderness.     Skin:     General: Skin is warm and dry.      Capillary Refill: Capillary refill takes less than 2 seconds.      Findings: Rash present. Rash is not crusting, pustular, scaling, urticarial or vesicular.      Comments: Pt has a circular rash on the inner aspect of right upper arm as well as a similar appearing rash/lesion on inner aspect of right thigh.  Appears raised around edge with central clearing.       Neurological:      Mental Status: She is alert and oriented to person, place, and time.      Sensory: No sensory deficit.      Motor: No abnormal muscle tone.      Gait: Gait normal.     Psychiatric:         Mood and Affect: Mood normal.         Speech: Speech normal.         Behavior: Behavior  normal. Behavior is cooperative.         Results Reviewed       Procedure Component Value Units Date/Time    RAPID HIV 1/2 AB-AG COMBO for 12 years old and above [[id number]]  (Normal) Collected: 06/15/25 1435    Lab Status: Final result Specimen: Blood from Arm, Right Updated: 06/15/25 1514     Rapid HIV 1 AND 2 Non-Reactive     HIV-1 P24 Ag Screen Non-Reactive    Narrative:      Negative for HIV-1 p24 Antigen.  Negative for HIV-1 and/or HIV-2 Antibody.    Urine Microscopic [[id number]]  (Abnormal) Collected: 06/15/25 1438    Lab Status: Final result Specimen: Urine, Clean Catch Updated: 06/15/25 1453     RBC, UA 0-1 /hpf      WBC, UA 4-10 /hpf      Epithelial Cells Occasional /hpf      Bacteria, UA Occasional /hpf      MUCUS THREADS Occasional    UA w Reflex to Microscopic w Reflex to Culture [[id number]]  (Abnormal) Collected: 06/15/25 1438    Lab Status: Final result Specimen: Urine, Clean Catch Updated: 06/15/25 1448     Color, UA Yellow     Clarity, UA Slightly Cloudy     Specific Gravity, UA 1.025     pH, UA 7.0     Leukocytes, UA Moderate     Nitrite, UA Negative     Protein, UA Trace mg/dl      Glucose, UA Negative mg/dl      Ketones, UA Negative mg/dl      Urobilinogen, UA <2.0 mg/dl      Bilirubin, UA Negative     Occult Blood, UA Negative    RPR-Syphilis Screening (Total Syphilis IGG/IGM) [[id number]] Collected: 06/15/25 1435    Lab Status: In process Specimen: Blood from Arm, Right Updated: 06/15/25 1443    Chlamydia/GC amplified DNA by PCR [[id number]] Collected: 06/15/25 1438    Lab Status: In process Specimen: Urine, Other Updated: 06/15/25 1442    POCT pregnancy, urine [[id number]]  (Normal) Collected: 06/15/25 1438    Lab Status: Final result Updated: 06/15/25 1442     EXT Preg Test, Ur Negative     Control Valid            No orders to display       Procedures    ED Medication and Procedure Management   Prior to Admission Medications   Prescriptions Last Dose Informant Patient Reported? Taking?    Prenatal MV-Min-Fe Fum-FA-DHA (PRENATAL 1 PO) Not Taking Self Yes No   Sig: Take 1 tablet by mouth   Patient not taking: Reported on 6/15/2025   famotidine (PEPCID) 20 mg tablet   No No   Sig: Take 0.5 tablets (10 mg total) by mouth 2 (two) times a day for 10 days   medroxyPROGESTERone (DEPO-PROVERA) 150 mg/mL injection   Yes Yes   Sig: Inject 150 mg into a muscle every 3 (three) months   methocarbamol (ROBAXIN) 500 mg tablet Not Taking  No No   Sig: Take 1 tablet (500 mg total) by mouth 2 (two) times a day as needed for muscle spasms   Patient not taking: Reported on 6/15/2025   methocarbamol (ROBAXIN) 500 mg tablet Not Taking  No No   Sig: Take 1 tablet (500 mg total) by mouth 2 (two) times a day   Patient not taking: Reported on 6/15/2025   methylPREDNISolone 4 MG tablet therapy pack Not Taking  No No   Sig: Use as directed on package   Patient not taking: Reported on 6/15/2025   ondansetron (ZOFRAN) 4 mg tablet Not Taking  No No   Sig: Take 1 tablet (4 mg total) by mouth every 6 (six) hours   Patient not taking: Reported on 6/15/2025      Facility-Administered Medications: None     Discharge Medication List as of 6/15/2025  2:47 PM        START taking these medications    Details   clotrimazole (LOTRIMIN) 1 % cream Apply to affected area 2 times daily, Print           CONTINUE these medications which have NOT CHANGED    Details   medroxyPROGESTERone (DEPO-PROVERA) 150 mg/mL injection Inject 150 mg into a muscle every 3 (three) months, Starting Wed 4/16/2025, Until Wed 3/18/2026, Historical Med      famotidine (PEPCID) 20 mg tablet Take 0.5 tablets (10 mg total) by mouth 2 (two) times a day for 10 days, Starting Sat 6/22/2024, Until Tue 7/2/2024, Normal           STOP taking these medications       methocarbamol (ROBAXIN) 500 mg tablet Comments:   Reason for Stopping:         methocarbamol (ROBAXIN) 500 mg tablet Comments:   Reason for Stopping:         methylPREDNISolone 4 MG tablet therapy pack Comments:    Reason for Stopping:         ondansetron (ZOFRAN) 4 mg tablet Comments:   Reason for Stopping:         Prenatal MV-Min-Fe Fum-FA-DHA (PRENATAL 1 PO) Comments:   Reason for Stopping:             No discharge procedures on file.  ED SEPSIS DOCUMENTATION   Time reflects when diagnosis was documented in both MDM as applicable and the Disposition within this note       Time User Action Codes Description Comment    6/15/2025  2:20 PM Hui Wright [R21] Skin rash     6/15/2025  2:20 PM Hui Wright [Z11.3] Screening examination for STI                    [1]   Past Medical History:  Diagnosis Date    ADHD (attention deficit hyperactivity disorder)     Asthma     Fetal growth restriction antepartum 10/07/2024    Last Assessment & Plan:      She presents for follow-up of fetal growth restriction. She has a history of class III obesity and GHTN in a prior pregnancy.     Today's ultrasound notes the following:     The ANAM is normal.     A BPP is 8/8.     Umbilical artery Doppler testing is normal.      Marijuana use during pregnancy 07/15/2024    Early THC use  Denies current use.      Obesity in pregnancy, antepartum 07/15/2024    The patient's pre-gravid BMI is 47.09.  Class 3      Pelvic pressure in pregnancy 10/24/2024    Tetrahydrocannabinol (THC) use disorder, mild, abuse 07/05/2024    Used in early pregnancy, stopped when found out pregnant      Vaginal discharge during pregnancy in third trimester 10/24/2024   [2] No past surgical history on file.  [3] No family history on file.  [4]   Social History  Tobacco Use    Smoking status: Never    Smokeless tobacco: Never    Tobacco comments:     pt admits to using marijuana   Vaping Use    Vaping status: Former   Substance Use Topics    Alcohol use: Yes     Comment: socially    Drug use: Not Currently     Types: Marijuana     Comment: daily.        Hui Wright PA-C  06/15/25 1523

## 2025-06-15 NOTE — DISCHARGE INSTRUCTIONS
Your test results will be available in [facility].  You will be contacted with any positive results.  Use the topical anti-fungal as prescribed for at least 2 weeks.  Can use an OTC medication such as claritin or zyrtec for itching.  Follow up with PCP or return to ER as needed.

## 2025-06-16 ENCOUNTER — RESULTS FOLLOW-UP (OUTPATIENT)
Dept: EMERGENCY DEPT | Facility: HOSPITAL | Age: 25
End: 2025-06-16

## 2025-06-16 LAB
C TRACH DNA SPEC QL NAA+PROBE: NEGATIVE
N GONORRHOEA DNA SPEC QL NAA+PROBE: POSITIVE
TREPONEMA PALLIDUM IGG+IGM AB [PRESENCE] IN SERUM OR PLASMA BY IMMUNOASSAY: NORMAL

## 2025-06-16 RX ORDER — CEFIXIME 400 MG/1
800 CAPSULE ORAL DAILY
Qty: 2 CAPSULE | Refills: 0 | Status: SHIPPED | OUTPATIENT
Start: 2025-06-16 | End: 2025-06-17

## 2025-07-30 ENCOUNTER — HOSPITAL ENCOUNTER (EMERGENCY)
Facility: HOSPITAL | Age: 25
Discharge: HOME | End: 2025-07-30
Attending: EMERGENCY MEDICINE | Admitting: EMERGENCY MEDICINE
Payer: MEDICARE

## 2025-07-30 VITALS
RESPIRATION RATE: 18 BRPM | OXYGEN SATURATION: 99 % | TEMPERATURE: 97.2 F | SYSTOLIC BLOOD PRESSURE: 161 MMHG | HEART RATE: 92 BPM | DIASTOLIC BLOOD PRESSURE: 109 MMHG

## 2025-07-30 DIAGNOSIS — N76.0 BV (BACTERIAL VAGINOSIS): ICD-10-CM

## 2025-07-30 DIAGNOSIS — R21 RASH: Primary | ICD-10-CM

## 2025-07-30 DIAGNOSIS — B96.89 BV (BACTERIAL VAGINOSIS): ICD-10-CM

## 2025-07-30 DIAGNOSIS — R30.0 DYSURIA: ICD-10-CM

## 2025-07-30 LAB
BACTERIA UR QL AUTO: ABNORMAL /HPF
BILIRUB UR QL STRIP: NEGATIVE
CLARITY UR: CLEAR
COLOR UR: YELLOW
EXT PREGNANCY TEST URINE: NEGATIVE
EXT. CONTROL: NORMAL
GLUCOSE UR STRIP-MCNC: NEGATIVE MG/DL
HGB UR QL STRIP.AUTO: NEGATIVE
KETONES UR STRIP-MCNC: NEGATIVE MG/DL
LEUKOCYTE ESTERASE UR QL STRIP: ABNORMAL
MUCOUS THREADS UR QL AUTO: ABNORMAL
NITRITE UR QL STRIP: NEGATIVE
NON-SQ EPI CELLS URNS QL MICRO: ABNORMAL /HPF
PH UR STRIP.AUTO: 6 [PH]
PROT UR STRIP-MCNC: NEGATIVE MG/DL
RBC #/AREA URNS AUTO: ABNORMAL /HPF
SP GR UR STRIP.AUTO: 1.02 (ref 1–1.03)
UROBILINOGEN UR STRIP-ACNC: <2 MG/DL
WBC #/AREA URNS AUTO: ABNORMAL /HPF

## 2025-07-30 PROCEDURE — 81514 NFCT DS BV&VAGINITIS DNA ALG: CPT | Performed by: EMERGENCY MEDICINE

## 2025-07-30 PROCEDURE — 99283 EMERGENCY DEPT VISIT LOW MDM: CPT

## 2025-07-30 PROCEDURE — 87591 N.GONORRHOEAE DNA AMP PROB: CPT | Performed by: EMERGENCY MEDICINE

## 2025-07-30 PROCEDURE — 81025 URINE PREGNANCY TEST: CPT | Performed by: EMERGENCY MEDICINE

## 2025-07-30 PROCEDURE — 87491 CHLMYD TRACH DNA AMP PROBE: CPT | Performed by: EMERGENCY MEDICINE

## 2025-07-30 PROCEDURE — 99284 EMERGENCY DEPT VISIT MOD MDM: CPT | Performed by: EMERGENCY MEDICINE

## 2025-07-30 PROCEDURE — 81001 URINALYSIS AUTO W/SCOPE: CPT | Performed by: EMERGENCY MEDICINE

## 2025-07-30 RX ORDER — KETOCONAZOLE 20 MG/ML
1 SHAMPOO, SUSPENSION TOPICAL DAILY
Qty: 120 ML | Refills: 0 | Status: SHIPPED | OUTPATIENT
Start: 2025-07-30 | End: 2025-08-06

## 2025-07-31 LAB
C GLABRATA DNA VAG QL NAA+PROBE: NEGATIVE
C KRUSEI DNA VAG QL NAA+PROBE: NEGATIVE
C TRACH DNA SPEC QL NAA+PROBE: NEGATIVE
CANDIDA SP 6 PNL VAG NAA+PROBE: NEGATIVE
N GONORRHOEA DNA SPEC QL NAA+PROBE: NEGATIVE
T VAGINALIS DNA VAG QL NAA+PROBE: NEGATIVE
VAGINOSIS/ITIS DNA PNL VAG PROBE+SIG AMP: POSITIVE

## 2025-07-31 RX ORDER — METRONIDAZOLE 500 MG/1
500 TABLET ORAL EVERY 8 HOURS SCHEDULED
Qty: 30 TABLET | Refills: 0 | Status: SHIPPED | OUTPATIENT
Start: 2025-07-31 | End: 2025-08-10